# Patient Record
Sex: FEMALE | Race: WHITE | NOT HISPANIC OR LATINO | Employment: OTHER | URBAN - METROPOLITAN AREA
[De-identification: names, ages, dates, MRNs, and addresses within clinical notes are randomized per-mention and may not be internally consistent; named-entity substitution may affect disease eponyms.]

---

## 2017-09-27 ENCOUNTER — TRANSCRIBE ORDERS (OUTPATIENT)
Dept: ADMINISTRATIVE | Facility: HOSPITAL | Age: 62
End: 2017-09-27

## 2017-09-27 DIAGNOSIS — Z12.31 VISIT FOR SCREENING MAMMOGRAM: Primary | ICD-10-CM

## 2017-10-02 ENCOUNTER — HOSPITAL ENCOUNTER (OUTPATIENT)
Dept: MAMMOGRAPHY | Facility: CLINIC | Age: 62
Discharge: HOME | End: 2017-10-02
Payer: COMMERCIAL

## 2017-10-02 DIAGNOSIS — Z12.31 VISIT FOR SCREENING MAMMOGRAM: ICD-10-CM

## 2017-10-02 DIAGNOSIS — Z12.31 ENCOUNTER FOR SCREENING MAMMOGRAM FOR MALIGNANT NEOPLASM OF BREAST: ICD-10-CM

## 2017-10-02 PROCEDURE — G0202 SCR MAMMO BI INCL CAD: HCPCS

## 2018-09-26 ENCOUNTER — TRANSCRIBE ORDERS (OUTPATIENT)
Dept: ADMINISTRATIVE | Facility: HOSPITAL | Age: 63
End: 2018-09-26

## 2018-09-26 DIAGNOSIS — Z13.820 OSTEOPOROSIS SCREENING: Primary | ICD-10-CM

## 2018-09-26 DIAGNOSIS — Z12.39 SCREENING BREAST EXAMINATION: Primary | ICD-10-CM

## 2018-10-03 ENCOUNTER — HOSPITAL ENCOUNTER (OUTPATIENT)
Dept: MAMMOGRAPHY | Facility: CLINIC | Age: 63
Discharge: HOME | End: 2018-10-03
Payer: COMMERCIAL

## 2018-10-03 DIAGNOSIS — Z13.820 OSTEOPOROSIS SCREENING: ICD-10-CM

## 2018-10-03 DIAGNOSIS — Z12.39 SCREENING BREAST EXAMINATION: ICD-10-CM

## 2018-10-03 PROCEDURE — 77067 SCR MAMMO BI INCL CAD: CPT

## 2018-10-03 PROCEDURE — 77080 DXA BONE DENSITY AXIAL: CPT

## 2019-10-09 ENCOUNTER — TRANSCRIBE ORDERS (OUTPATIENT)
Dept: ADMINISTRATIVE | Facility: HOSPITAL | Age: 64
End: 2019-10-09

## 2019-10-09 DIAGNOSIS — Z13.6 ENCOUNTER FOR SCREENING FOR VASCULAR DISEASE: Primary | ICD-10-CM

## 2019-10-09 DIAGNOSIS — Z12.31 SCREENING MAMMOGRAM FOR HIGH-RISK PATIENT: Primary | ICD-10-CM

## 2019-10-17 ENCOUNTER — HOSPITAL ENCOUNTER (OUTPATIENT)
Dept: MAMMOGRAPHY | Facility: CLINIC | Age: 64
Discharge: HOME | End: 2019-10-17
Payer: COMMERCIAL

## 2019-10-17 VITALS — WEIGHT: 161 LBS | HEIGHT: 66 IN | BODY MASS INDEX: 25.88 KG/M2

## 2019-10-17 DIAGNOSIS — Z12.31 SCREENING MAMMOGRAM FOR HIGH-RISK PATIENT: ICD-10-CM

## 2019-10-17 PROCEDURE — 77067 SCR MAMMO BI INCL CAD: CPT

## 2019-10-18 ENCOUNTER — HOSPITAL ENCOUNTER (OUTPATIENT)
Dept: NON INVASIVE DIAGNOSTICS | Facility: HOSPITAL | Age: 64
Discharge: HOME | End: 2019-10-18
Payer: COMMERCIAL

## 2019-10-18 DIAGNOSIS — Z13.6 ENCOUNTER FOR SCREENING FOR VASCULAR DISEASE: ICD-10-CM

## 2019-10-18 PROCEDURE — 93922 UPR/L XTREMITY ART 2 LEVELS: CPT

## 2019-10-18 PROCEDURE — VASC: Performed by: SURGERY

## 2020-09-28 ENCOUNTER — TRANSCRIBE ORDERS (OUTPATIENT)
Dept: ADMINISTRATIVE | Facility: HOSPITAL | Age: 65
End: 2020-09-28

## 2020-09-28 DIAGNOSIS — Z12.31 ENCOUNTER FOR SCREENING MAMMOGRAM FOR MALIGNANT NEOPLASM OF BREAST: Primary | ICD-10-CM

## 2020-09-29 ENCOUNTER — TRANSCRIBE ORDERS (OUTPATIENT)
Dept: ADMINISTRATIVE | Facility: HOSPITAL | Age: 65
End: 2020-09-29

## 2020-09-29 DIAGNOSIS — Z13.6 ENCOUNTER FOR SCREENING FOR CARDIOVASCULAR DISORDERS: Primary | ICD-10-CM

## 2020-10-21 ENCOUNTER — HOSPITAL ENCOUNTER (OUTPATIENT)
Dept: MAMMOGRAPHY | Facility: CLINIC | Age: 65
Discharge: HOME | End: 2020-10-21
Payer: COMMERCIAL

## 2020-10-21 VITALS — WEIGHT: 161 LBS | HEIGHT: 66 IN | BODY MASS INDEX: 25.88 KG/M2

## 2020-10-21 DIAGNOSIS — Z12.31 ENCOUNTER FOR SCREENING MAMMOGRAM FOR MALIGNANT NEOPLASM OF BREAST: ICD-10-CM

## 2020-10-21 PROCEDURE — 77063 BREAST TOMOSYNTHESIS BI: CPT

## 2020-10-21 PROCEDURE — 77067 SCR MAMMO BI INCL CAD: CPT

## 2021-03-10 DIAGNOSIS — Z23 ENCOUNTER FOR IMMUNIZATION: ICD-10-CM

## 2021-08-30 ENCOUNTER — HOSPITAL ENCOUNTER (OUTPATIENT)
Dept: BONE DENSITY | Facility: CLINIC | Age: 66
Discharge: HOME | End: 2021-08-30
Payer: COMMERCIAL

## 2021-08-30 DIAGNOSIS — Z13.820 ENCOUNTER FOR SCREENING FOR OSTEOPOROSIS: ICD-10-CM

## 2021-08-30 PROCEDURE — 77080 DXA BONE DENSITY AXIAL: CPT

## 2021-10-22 ENCOUNTER — HOSPITAL ENCOUNTER (OUTPATIENT)
Dept: MAMMOGRAPHY | Facility: CLINIC | Age: 66
Discharge: HOME | End: 2021-10-22
Payer: COMMERCIAL

## 2021-10-22 VITALS — WEIGHT: 155 LBS | BODY MASS INDEX: 24.91 KG/M2 | HEIGHT: 66 IN

## 2021-10-22 DIAGNOSIS — Z12.31 ENCOUNTER FOR SCREENING MAMMOGRAM FOR MALIGNANT NEOPLASM OF BREAST: ICD-10-CM

## 2021-10-22 PROCEDURE — 77063 BREAST TOMOSYNTHESIS BI: CPT

## 2021-10-22 PROCEDURE — 77067 SCR MAMMO BI INCL CAD: CPT

## 2022-01-12 ENCOUNTER — CONSULT (OUTPATIENT)
Dept: ENDOCRINOLOGY | Facility: HOSPITAL | Age: 67
End: 2022-01-12
Payer: COMMERCIAL

## 2022-01-12 VITALS
HEIGHT: 66 IN | BODY MASS INDEX: 26.2 KG/M2 | SYSTOLIC BLOOD PRESSURE: 120 MMHG | HEART RATE: 92 BPM | WEIGHT: 163 LBS | DIASTOLIC BLOOD PRESSURE: 76 MMHG

## 2022-01-12 DIAGNOSIS — E11.42 DIABETIC POLYNEUROPATHY ASSOCIATED WITH TYPE 2 DIABETES MELLITUS (HCC): ICD-10-CM

## 2022-01-12 DIAGNOSIS — E11.65 TYPE 2 DIABETES MELLITUS WITH HYPERGLYCEMIA, WITHOUT LONG-TERM CURRENT USE OF INSULIN (HCC): Primary | ICD-10-CM

## 2022-01-12 DIAGNOSIS — I10 PRIMARY HYPERTENSION: ICD-10-CM

## 2022-01-12 DIAGNOSIS — E78.2 MIXED HYPERLIPIDEMIA: ICD-10-CM

## 2022-01-12 PROCEDURE — 99205 OFFICE O/P NEW HI 60 MIN: CPT | Performed by: INTERNAL MEDICINE

## 2022-01-12 RX ORDER — CALCIUM CITRATE/VITAMIN D3 200MG-6.25
1 TABLET ORAL
COMMUNITY

## 2022-01-12 RX ORDER — VALSARTAN AND HYDROCHLOROTHIAZIDE 320; 25 MG/1; MG/1
1 TABLET, FILM COATED ORAL DAILY
COMMUNITY

## 2022-01-12 RX ORDER — METFORMIN HYDROCHLORIDE 500 MG/1
1000 TABLET, EXTENDED RELEASE ORAL
COMMUNITY
End: 2022-01-12 | Stop reason: SDUPTHER

## 2022-01-12 RX ORDER — LANOLIN ALCOHOL/MO/W.PET/CERES
1000 CREAM (GRAM) TOPICAL DAILY
Start: 2022-01-12

## 2022-01-12 RX ORDER — ZINC GLUCONATE 50 MG
50 TABLET ORAL DAILY
COMMUNITY

## 2022-01-12 RX ORDER — PIOGLITAZONEHYDROCHLORIDE 45 MG/1
45 TABLET ORAL DAILY
COMMUNITY
Start: 2021-12-15

## 2022-01-12 RX ORDER — MULTIVIT-MIN/IRON/FOLIC ACID/K 18-600-40
CAPSULE ORAL DAILY
COMMUNITY

## 2022-01-12 RX ORDER — SITAGLIPTIN AND METFORMIN HYDROCHLORIDE 100; 1000 MG/1; MG/1
1 TABLET, FILM COATED, EXTENDED RELEASE ORAL DAILY
COMMUNITY
Start: 2022-01-01 | End: 2022-04-14 | Stop reason: SDUPTHER

## 2022-01-12 RX ORDER — OMEPRAZOLE 20 MG/1
20 CAPSULE, DELAYED RELEASE ORAL DAILY
COMMUNITY

## 2022-01-12 RX ORDER — ASPIRIN 81 MG/1
81 TABLET ORAL DAILY
COMMUNITY

## 2022-01-12 RX ORDER — METFORMIN HYDROCHLORIDE 500 MG/1
1000 TABLET, EXTENDED RELEASE ORAL
Start: 2022-01-12

## 2022-01-12 NOTE — PATIENT INSTRUCTIONS
hgba1c is 8 7%  this is too high  The pioglitazone may take a bit longer to see full effect  For now, continue the same dose of janumet, metformin, and pioglitazone  Let's move the metformin XR 1000 mg to the evening  Leave the janumet /1000 mg in am   Continue to take the pioglitazone in the am   Let's get you to diabetes education for nutrition  Continue to test blood sugars 2 times a day, try to test before and 2 hours after a meal, vary the meals  Take vitamin B 12 1000 mcg daily regularly  Follow up in 3 months  Blood work is due after feb 8, 2022

## 2022-01-12 NOTE — PROGRESS NOTES
1/12/2022    Assessment/Plan      Diagnoses and all orders for this visit:    Type 2 diabetes mellitus with hyperglycemia, without long-term current use of insulin ([facility]  -     Ambulatory referral to Diabetic Education; Future  -     vitamin B-12 (VITAMIN B-12) 1,000 mcg tablet; Take 1 tablet (1,000 mcg total) by mouth daily  -     metFORMIN (GLUCOPHAGE-XR) 500 mg 24 hr tablet; Take 2 tablets (1,000 mg total) by mouth daily with dinner  -     HEMOGLOBIN A1C W/ EAG ESTIMATION Lab Collect; Future  -     Comprehensive metabolic panel Lab Collect; Future  -     TSH, 3rd generation Lab Collect; Future  -     Microalbumin / creatinine urine ratio Lab Collect; Future    Primary hypertension  -     HEMOGLOBIN A1C W/ EAG ESTIMATION Lab Collect; Future  -     Comprehensive metabolic panel Lab Collect; Future  -     TSH, 3rd generation Lab Collect; Future  -     Microalbumin / creatinine urine ratio Lab Collect; Future    Mixed hyperlipidemia  -     HEMOGLOBIN A1C W/ EAG ESTIMATION Lab Collect; Future  -     Comprehensive metabolic panel Lab Collect; Future  -     TSH, 3rd generation Lab Collect; Future  -     Microalbumin / creatinine urine ratio Lab Collect; Future    Diabetic polyneuropathy associated with type 2 diabetes mellitus (HCC)  -     vitamin B-12 (VITAMIN B-12) 1,000 mcg tablet; Take 1 tablet (1,000 mcg total) by mouth daily  -     HEMOGLOBIN A1C W/ EAG ESTIMATION Lab Collect; Future  -     Comprehensive metabolic panel Lab Collect; Future  -     TSH, 3rd generation Lab Collect; Future  -     Microalbumin / creatinine urine ratio Lab Collect; Future    Other orders  -     Janumet -1000 MG TB24; Take 1 tablet by mouth in the morning    -     pioglitazone (ACTOS) 45 mg tablet; Take 45 mg by mouth daily    -     valsartan-hydrochlorothiazide (DIOVAN-HCT) 320-25 MG per tablet; Take 1 tablet by mouth daily  -     Discontinue: metFORMIN (GLUCOPHAGE-XR) 500 mg 24 hr tablet;  Take 1,000 mg by mouth daily with breakfast  -     aspirin (ECOTRIN LOW STRENGTH) 81 mg EC tablet; Take 81 mg by mouth daily  -     Multiple Vitamins-Minerals (Multivitamin Gummies Womens) CHEW; Chew 1 each daily  -     Ascorbic Acid (Vitamin C) 500 MG CAPS; Take by mouth in the morning  -     zinc gluconate 50 mg tablet; Take 50 mg by mouth daily  -     Nutritional Supplements (CANDIDA COMPLEX PO); Take by mouth Candida support supplement daily  -     omeprazole (PriLOSEC) 20 mg delayed release capsule; Take 20 mg by mouth daily    -     Magnesium Cl-Calcium Carbonate (SLOW-MAG PO); Take by mouth in the morning  -     MISC NATURAL PRODUCTS EX; Apply topically Magnesium cream to legs at night for leg cramps        Assessment/Plan:  1  Type 2 diabetes  Hemoglobin A1c was 8 7% when last evaluated  This is too high and demonstrates uncontrolled diabetes  She has started pioglitazone since that hemoglobin A1c and I reassured her that pioglitazone can take up to 3 months or more for full affect and has not even been 2 months  For now, she will continue the same pioglitazone 45 mg daily in the morning  I have asked her to move her metformin to metformin XR 1000 mg in the evening and continue Janumet /1000 mg in the morning  Perhaps by moving and splitting her metformin to twice a day this may help her blood sugars  I have asked her to continue to test her blood sugars twice a day before and 2 hours after a meal and vary the meals from day-to-day and keep a record  She will make an appointment with Diabetes Education for medical nutrition therapy as she has not been following her diet very well and needs location as she has never had Diabetes Education in the past     2  Diabetic neuropathy  Diabetic foot exam was performed in the office today  She has very minor decrease in vibration sensation with intact microfilament sensation so by definition does have neuropathy  I reassured her that is not a significant issue    I have recommended she start taking vitamin B12 1000 mcg daily on a regular basis as this may help with her neuropathic symptoms especially in light of her metformin usage as metformin can sometimes decrease absorption of vitamin B12     3  Hypertension  She is normotensive in the office on her current dose of valsartan/HCTZ  4  Hyperlipidemia  She will continue the same atorvastatin and fenofibrate  I have asked her to get a hemoglobin A1c, CMP, TSH, and urine microalbumin to creatinine ratio done next month when she is due for her next blood work  I have asked her to follow up in 3 months and blood work will be determined  CC: Diabetes Consult    History of Present Illness     HPI: Ashley Walsh is a 77y o  year old female with type 2 diabetes for about 18 years, hyperlipidemia, hypertension for evaluation/consult  She was diagnosed with gestational diabetes and pregnant with her children 30-40 years ago  She reports she originally started on metformin and was switched to Janumet about 8-10 years ago  She reports she did try Jardiance but developed yeast infections and was too costly and stopped it within the last month or so  She had tried Invokana in the past but this did not change her blood sugars  She had tried Trulicity in the past but this was too costly and her blood sugars did improve and was discontinued around March 2021  Pioglitazone was added in November 2021 after her last blood work  She has limitations on the types of medications she can use due to being on Medicare and the cost of medicines when she is in the donut hole  She has never been on insulin or so funny urea in the past   She is on oral agents at home and takes metformin  mg 2 tablets in the morning and Janumet /1000 mg in the morning and pioglitazone 45 mg daily  She denies any polyphagia, polydipsia, and blurry vision  She has polyuria and once or twice a night nocturia    She has rare numbness and tingling of the feet  She denies chest pain or shortness of breath  She denies nephropathy, retinopathy, heart attack, stroke and claudication but does admit to neuropathy  She has never been to Diabetes Education and admits that carbohydrates are the problem and she tries to eat better snacks but is not always consistent with this  Hypoglycemic episodes: No never  H/o of hypoglycemia causing hospitalization or intervention such as glucagon injection  or ambulance call  No   Hypoglycemia symptoms: dizzines and faint  Treatment of hypoglycemia: eat food  Glucagon:No   Medic alert tag: recommended,Yes  The patient's last eye exam was in summer 2021 with no retinopathy but a slight cataract starting  The patient's last foot exam was not recently  She does not see Podiatry  Last A1C was 8 7% on 11/8/2021  Blood Sugar/Glucometer/Pump/CGM review:  Blood sugars are tested twice a day  Blood sugars are always in the upper 100s in the morning  She can of blood sugars that go down to the 80s at about 2 hours after meals and often about 150s before meals later in the day  She denies hypoglycemia  She has hyperlipidemia and takes fenofibrate 54 mg daily and atorvastatin 40 mg daily  She denies chest pain or shortness of breath  She has hypertension and takes valsartan/HCTZ 320/25 mg daily  She denies headache or stroke-like symptoms  Review of Systems   Constitutional: Positive for fatigue  Negative for unexpected weight change  Admits diet not the best  Trying to pick better snacks  HENT: Negative for hearing loss, tinnitus and trouble swallowing  Eyes: Negative for visual disturbance  Respiratory: Negative for chest tightness and shortness of breath  Cardiovascular: Negative for chest pain, palpitations and leg swelling  Gastrointestinal: Negative for abdominal pain, constipation, diarrhea and nausea  Endocrine: Positive for polyuria  Negative for polydipsia and polyphagia  Nocturia 1-2 times a night  Musculoskeletal: Negative for arthralgias and back pain  Skin: Negative for rash and wound  Some finger cracking and dry skin  No brittle nails  No hair loss  Neurological: Positive for numbness  Negative for dizziness, tremors, weakness, light-headedness and headaches  Rare numbness and tingling of the feet, will use magnesium cream and helps  In the cold, fingers and toes get numb easily  Psychiatric/Behavioral: Positive for sleep disturbance  Negative for dysphoric mood  The patient is not nervous/anxious  Stays up too late, then wakes to urinate          Historical Information   Past Medical History:   Diagnosis Date    Bicornate uterus     GERD (gastroesophageal reflux disease)     Hyperlipidemia     Hypertension      Past Surgical History:   Procedure Laterality Date     SECTION      X 2    CHOLECYSTECTOMY      lap    TUBAL LIGATION Bilateral      Social History   Social History     Substance and Sexual Activity   Alcohol Use Yes    Alcohol/week: 4 0 standard drinks    Types: 2 Glasses of wine, 2 Cans of beer per week     Social History     Substance and Sexual Activity   Drug Use No     Social History     Tobacco Use   Smoking Status Former Smoker    Packs/day: 0 25    Years: 10 00    Pack years: 2 50    Types: Cigarettes    Start date: 1975   Nikita Abt Quit date: 1985    Years since quittin 0   Smokeless Tobacco Never Used     Family History:   Family History   Problem Relation Age of Onset    Myocarditis Mother     Melanoma Father     Diabetes type II Father     Stroke Father     Diabetes type II Sister     Hypertension Sister     No Known Problems Daughter     Breast cancer Maternal Grandmother          at an early age   Nikita Abt No Known Problems Maternal Grandfather     No Known Problems Paternal Grandmother     No Known Problems Paternal Grandfather     No Known Problems Daughter     Breast cancer Maternal Aunt         unknown age of onset   David Oleary No Known Problems Maternal Aunt     No Known Problems Paternal Aunt     Hypertension Brother     Diabetes type II Paternal Uncle        Meds/Allergies   Current Outpatient Medications   Medication Sig Dispense Refill    Ascorbic Acid (Vitamin C) 500 MG CAPS Take by mouth in the morning      aspirin (ECOTRIN LOW STRENGTH) 81 mg EC tablet Take 81 mg by mouth daily      atorvastatin (LIPITOR) 40 mg tablet Take 40 mg by mouth in the morning        FENOFIBRATE MICRONIZED PO Take 54 mg by mouth in the morning        Janumet -1000 MG TB24 Take 1 tablet by mouth in the morning        Magnesium Cl-Calcium Carbonate (SLOW-MAG PO) Take by mouth in the morning      metFORMIN (GLUCOPHAGE-XR) 500 mg 24 hr tablet Take 2 tablets (1,000 mg total) by mouth daily with dinner      MISC NATURAL PRODUCTS EX Apply topically Magnesium cream to legs at night for leg cramps      Multiple Vitamins-Minerals (Multivitamin Gummies Womens) CHEW Chew 1 each daily      Nutritional Supplements (CANDIDA COMPLEX PO) Take by mouth Candida support supplement daily      omeprazole (PriLOSEC) 20 mg delayed release capsule Take 20 mg by mouth daily        pioglitazone (ACTOS) 45 mg tablet Take 45 mg by mouth daily        valsartan-hydrochlorothiazide (DIOVAN-HCT) 320-25 MG per tablet Take 1 tablet by mouth daily      zinc gluconate 50 mg tablet Take 50 mg by mouth daily      vitamin B-12 (VITAMIN B-12) 1,000 mcg tablet Take 1 tablet (1,000 mcg total) by mouth daily       No current facility-administered medications for this visit  Allergies   Allergen Reactions    Jardiance [Empagliflozin] Other (See Comments)     Frequent yeast infections    Iodinated Diagnostic Agents Rash       Objective   Vitals: Blood pressure 120/76, pulse 92, height 5' 6" (1 676 m), weight 73 9 kg (163 lb)  Invasive Devices  Report    None                 Physical Exam  Vitals reviewed     Constitutional: Appearance: Normal appearance  She is well-developed  HENT:      Head: Normocephalic and atraumatic  Eyes:      Extraocular Movements: Extraocular movements intact  Conjunctiva/sclera: Conjunctivae normal       Comments: No lid lag, stare, proptosis, or periorbital edema  Neck:      Thyroid: No thyromegaly  Vascular: No carotid bruit  Comments: Thyroid normal in size  No palpable thyroid nodules  No bruits over the thyroid gland  Cardiovascular:      Rate and Rhythm: Normal rate and regular rhythm  Pulses: Normal pulses  no weak pulses          Dorsalis pedis pulses are 2+ on the right side and 2+ on the left side  Posterior tibial pulses are 2+ on the right side and 2+ on the left side  Heart sounds: Normal heart sounds  No murmur heard  Pulmonary:      Effort: Pulmonary effort is normal       Breath sounds: Normal breath sounds  No wheezing  Abdominal:      General: Bowel sounds are normal       Palpations: Abdomen is soft  Tenderness: There is no abdominal tenderness  Musculoskeletal:         General: No deformity  Normal range of motion  Cervical back: Normal range of motion and neck supple  Right lower leg: No edema  Left lower leg: No edema  Comments: No ulcerations of the feet  No tremor of the outstretched hands  No spinous process tenderness  No CVA tenderness  Feet:      Right foot:      Skin integrity: No ulcer, skin breakdown, erythema, warmth, callus or dry skin  Left foot:      Skin integrity: No ulcer, skin breakdown, erythema, warmth, callus or dry skin  Lymphadenopathy:      Cervical: No cervical adenopathy  Skin:     General: Skin is warm and dry  Findings: No rash  Neurological:      Mental Status: She is alert and oriented to person, place, and time  Deep Tendon Reflexes: Reflexes are normal and symmetric  Comments: Vibration sensation diminished to the 1st toe DIP joint bilaterally  Microfilament sensation intact bilaterally  Deep tendon reflexes normal      Patient's shoes and socks removed  Right Foot/Ankle   Right Foot Inspection  Skin Exam: skin normal and skin intact  No dry skin, no warmth, no callus, no erythema, no maceration, no abnormal color, no pre-ulcer, no ulcer and no callus  Toe Exam: ROM and strength within normal limits  No swelling and  no right toe deformity    Sensory   Vibration: diminished  Monofilament testing: intact    Vascular  Capillary refills: < 3 seconds  The right DP pulse is 2+  The right PT pulse is 2+  Left Foot/Ankle  Left Foot Inspection  Skin Exam: skin normal and skin intact  No dry skin, no warmth, no erythema, no maceration, normal color, no pre-ulcer, no ulcer and no callus  Toe Exam: ROM and strength within normal limits  No swelling and no left toe deformity  Sensory   Vibration: diminished  Monofilament testing: intact    Vascular  Capillary refills: < 3 seconds  The left DP pulse is 2+  The left PT pulse is 2+  Assign Risk Category  No deformity present  Loss of protective sensation  No weak pulses  Risk: 1        The history was obtained from the review of the chart and from the patient  Lab Results:    Blood work done on 11/08/2021 demonstrates a hemoglobin A1c of 8 7%  Please BS hemoglobin A1c on 03/12/2021 was 7 4% and on 04/01/2020 was 7 3%        Future Appointments   Date Time Provider Yamila Coats   1/20/2022  2:00 PM Hank Miguel DIAB ED [facility]   4/13/2022 11:40 AM Dorys Finch MD ENDO [facility]

## 2022-01-20 ENCOUNTER — OFFICE VISIT (OUTPATIENT)
Dept: DIABETES SERVICES | Facility: HOSPITAL | Age: 67
End: 2022-01-20
Payer: COMMERCIAL

## 2022-01-20 VITALS — BODY MASS INDEX: 26.31 KG/M2 | WEIGHT: 163 LBS

## 2022-01-20 DIAGNOSIS — E11.65 TYPE 2 DIABETES MELLITUS WITH HYPERGLYCEMIA, WITHOUT LONG-TERM CURRENT USE OF INSULIN (HCC): Primary | ICD-10-CM

## 2022-01-20 PROCEDURE — 97802 MEDICAL NUTRITION INDIV IN: CPT | Performed by: DIETITIAN, REGISTERED

## 2022-01-20 NOTE — PROGRESS NOTES
Medical Nutrition Therapy     Assessment    Visit Type: Initial visit  Chief complaint:  Type 2 Diabetes     HPI:   Met with Mitzi Troy for MNT initial visit  States she has started testing her blood sugars more since her last high A1C  Twice a day currently, testing midmorning not before breakfast, and at night  I asked her to start pair testing, before a meal and 2hr after a meal, rotating between meals  This is the best way to learn how food and meals affect her personally  Food recall shows primary issues: Carbs vary throughout the day, discussed the benefit of consistent carb intake throughout the day  Together we discussed what foods contain CHO, reading a food label, serving sizes, and set a carb goal of 30-45g CHO/meal to promote weight loss with 15g snacks  Put together sample meals for Sarahi's reference and evaluated Sarahi's current eating plan  Good understanding, Jenise Obrien will call with questions or for more education  Follow up as needed if not improving  Ht Readings from Last 1 Encounters:   01/12/22 5' 6" (1 676 m)     Wt Readings from Last 3 Encounters:   01/20/22 73 9 kg (163 lb)   01/12/22 73 9 kg (163 lb)   10/22/21 70 3 kg (155 lb)        Body mass index is 26 31 kg/m²       No results found for: HGBA1C    No results found for: CHOL  No results found for: HDL  No results found for: LDLCALC  No results found for: TRIG  No results found for: CHOLHDL    Weight Change: No    Medical Diagnosis/ICD 10 Code:  E11 65    Barriers to Learning: no barriers    Do you follow any special diet presently?: No  Who shops: patient and spouse  Who cooks: patient    Food Log: Completed via the method of food recall- lives with spouse     Breakfast: up around 7-8am  Bowl of cereal and fruit; 1 bread and homemade jam; eggs sometimes half a bagel  Morning Snack:no much, tea  Lunch: by 1pm: triple zero yogurt with fruit added and crackers and cheese; 1 bread with turkey and cheese, veggies, pickle; soups homemade bowl and crackers and cheese; no leftovers  Afternoon Snack:  Fiber brownie bar, small banana or half or other fruit, a few pretzels a little  Dinner: 6pm, but sometimes later  Meat starch veggie hot meal, goes their own potatoes, will have half a baked potatoes hot meal  Salads with dinner  Evening Snack: trouble spot, pretzel mily, sugar free cookies, tea, applesauce, yogurt, bed: 11:30pm  Beverages: water, diet iced tea, diet soda, hot tea in the winter with a little milk, no milk, no juice, cranberry with bladder issues, alcohol sometimes cirilo ultra 1   Eating out/Take out:  Exercise ADL, nothing structured, working on the farm active  Nutrition Diagnosis:  Food and nutrition related knowledge deficit  related to Lack of prior exposure to accurate nutrition related information as evidenced by Verbalizes inaccurate or incomplete information    Intervention: plate method, label reading, carbohydrate counting, meal planning, individualized meal plan and monitoring portion control     Treatment Goals: Patient understands education and recommendations    Education Material Given  Natty Chaudhari was provided the Portion Booklet and Planning Healthy Meals     Monitoring and evaluation:    Term code indicator   1 6 3 Carbohydrate Intake Criteria: 30-45g CHO per meal, 15g CHO snacks    Patients Response to Instruction:  Marc Deluna  Expected Compliancegood    Thank you for coming to the [facility] for education today  Please feel free to call with any questions or concerns      Jordon Marmolejo, [address]  [address] [facility] [address]  [address] [facility] [id number]

## 2022-03-16 LAB
ALBUMIN SERPL-MCNC: 4.4 G/DL (ref 3.6–5.1)
ALBUMIN/CREAT UR: 6 MCG/MG CREAT
ALBUMIN/GLOB SERPL: 1.8 (CALC) (ref 1–2.5)
ALP SERPL-CCNC: 28 U/L (ref 37–153)
ALT SERPL-CCNC: 13 U/L (ref 6–29)
AST SERPL-CCNC: 13 U/L (ref 10–35)
BILIRUB SERPL-MCNC: 0.5 MG/DL (ref 0.2–1.2)
BUN SERPL-MCNC: 27 MG/DL (ref 7–25)
BUN/CREAT SERPL: 43 (CALC) (ref 6–22)
CALCIUM SERPL-MCNC: 9.7 MG/DL (ref 8.6–10.4)
CHLORIDE SERPL-SCNC: 100 MMOL/L (ref 98–110)
CO2 SERPL-SCNC: 29 MMOL/L (ref 20–32)
CREAT SERPL-MCNC: 0.63 MG/DL (ref 0.5–0.99)
CREAT UR-MCNC: 50 MG/DL (ref 20–275)
EST. AVERAGE GLUCOSE BLD GHB EST-MCNC: 160 MG/DL
EST. AVERAGE GLUCOSE BLD GHB EST-SCNC: 8.9 MMOL/L
GLOBULIN SER CALC-MCNC: 2.5 G/DL (CALC) (ref 1.9–3.7)
GLUCOSE SERPL-MCNC: 111 MG/DL (ref 65–99)
HBA1C MFR BLD: 7.2 % OF TOTAL HGB
MICROALBUMIN UR-MCNC: 0.3 MG/DL
POTASSIUM SERPL-SCNC: 4 MMOL/L (ref 3.5–5.3)
PROT SERPL-MCNC: 6.9 G/DL (ref 6.1–8.1)
SL AMB EGFR AFRICAN AMERICAN: 108 ML/MIN/1.73M2
SL AMB EGFR NON AFRICAN AMERICAN: 93 ML/MIN/1.73M2
SODIUM SERPL-SCNC: 138 MMOL/L (ref 135–146)
TSH SERPL-ACNC: 3.35 MIU/L (ref 0.4–4.5)

## 2022-03-18 ENCOUNTER — TELEPHONE (OUTPATIENT)
Dept: ENDOCRINOLOGY | Facility: HOSPITAL | Age: 67
End: 2022-03-18

## 2022-03-21 NOTE — TELEPHONE ENCOUNTER
I responded to her via the patient portal  Can we get her freestyle [place] as she says we are now linked  I will order blood work for before her next appointment

## 2022-03-22 NOTE — TELEPHONE ENCOUNTER
Freestyle [place] download from 03/09/2022 through 03/22/2022 was reviewed  Overall, her blood sugars look quite good with 98% of blood sugars in target range  There are few higher blood sugars with 1 day that had a spike in the evening but for the most part, blood sugars look quite good  For now, continue the same metformin, Janumet, and pioglitazone

## 2022-04-14 ENCOUNTER — OFFICE VISIT (OUTPATIENT)
Dept: ENDOCRINOLOGY | Facility: HOSPITAL | Age: 67
End: 2022-04-14
Payer: COMMERCIAL

## 2022-04-14 VITALS
HEIGHT: 66 IN | SYSTOLIC BLOOD PRESSURE: 120 MMHG | DIASTOLIC BLOOD PRESSURE: 68 MMHG | BODY MASS INDEX: 25.78 KG/M2 | HEART RATE: 94 BPM | WEIGHT: 160.4 LBS | OXYGEN SATURATION: 98 %

## 2022-04-14 DIAGNOSIS — E11.65 TYPE 2 DIABETES MELLITUS WITH HYPERGLYCEMIA, WITHOUT LONG-TERM CURRENT USE OF INSULIN (HCC): Primary | ICD-10-CM

## 2022-04-14 DIAGNOSIS — E11.42 DIABETIC POLYNEUROPATHY ASSOCIATED WITH TYPE 2 DIABETES MELLITUS (HCC): ICD-10-CM

## 2022-04-14 DIAGNOSIS — E78.2 MIXED HYPERLIPIDEMIA: ICD-10-CM

## 2022-04-14 DIAGNOSIS — I10 PRIMARY HYPERTENSION: ICD-10-CM

## 2022-04-14 PROCEDURE — 99214 OFFICE O/P EST MOD 30 MIN: CPT | Performed by: INTERNAL MEDICINE

## 2022-04-14 PROCEDURE — 95251 CONT GLUC MNTR ANALYSIS I&R: CPT | Performed by: INTERNAL MEDICINE

## 2022-04-14 RX ORDER — SITAGLIPTIN AND METFORMIN HYDROCHLORIDE 100; 1000 MG/1; MG/1
1 TABLET, FILM COATED, EXTENDED RELEASE ORAL DAILY
Qty: 30 TABLET | Refills: 5 | Status: SHIPPED | OUTPATIENT
Start: 2022-04-14

## 2022-04-14 NOTE — PATIENT INSTRUCTIONS
hgba1c is 7 2%  this is much better  No cahnge in medications for now  Continue to work on diet  Continue to use the freestyle [place]  Follow up in 3 months with blood work

## 2022-05-09 LAB
LEFT EYE DIABETIC RETINOPATHY: NORMAL
RIGHT EYE DIABETIC RETINOPATHY: NORMAL

## 2022-07-12 LAB
ALBUMIN SERPL-MCNC: 4.5 G/DL (ref 3.6–5.1)
ALBUMIN/GLOB SERPL: 1.9 (CALC) (ref 1–2.5)
ALP SERPL-CCNC: 26 U/L (ref 37–153)
ALT SERPL-CCNC: 14 U/L (ref 6–29)
AST SERPL-CCNC: 11 U/L (ref 10–35)
BILIRUB SERPL-MCNC: 0.3 MG/DL (ref 0.2–1.2)
BUN SERPL-MCNC: 21 MG/DL (ref 7–25)
BUN/CREAT SERPL: ABNORMAL (CALC) (ref 6–22)
CALCIUM SERPL-MCNC: 9.6 MG/DL (ref 8.6–10.4)
CHLORIDE SERPL-SCNC: 105 MMOL/L (ref 98–110)
CHOLEST SERPL-MCNC: 141 MG/DL
CHOLEST/HDLC SERPL: 2 (CALC)
CO2 SERPL-SCNC: 29 MMOL/L (ref 20–32)
CREAT SERPL-MCNC: 0.6 MG/DL (ref 0.5–1.05)
EST. AVERAGE GLUCOSE BLD GHB EST-MCNC: 137 MG/DL
EST. AVERAGE GLUCOSE BLD GHB EST-SCNC: 7.6 MMOL/L
GFR/BSA.PRED SERPLBLD CYS-BASED-ARV: 99 ML/MIN/1.73M2
GLOBULIN SER CALC-MCNC: 2.4 G/DL (CALC) (ref 1.9–3.7)
GLUCOSE SERPL-MCNC: 123 MG/DL (ref 65–99)
HBA1C MFR BLD: 6.4 % OF TOTAL HGB
HDLC SERPL-MCNC: 69 MG/DL
LDLC SERPL CALC-MCNC: 58 MG/DL (CALC)
NONHDLC SERPL-MCNC: 72 MG/DL (CALC)
POTASSIUM SERPL-SCNC: 4.3 MMOL/L (ref 3.5–5.3)
PROT SERPL-MCNC: 6.9 G/DL (ref 6.1–8.1)
SODIUM SERPL-SCNC: 140 MMOL/L (ref 135–146)
TRIGL SERPL-MCNC: 59 MG/DL

## 2022-07-19 ENCOUNTER — OFFICE VISIT (OUTPATIENT)
Dept: ENDOCRINOLOGY | Facility: HOSPITAL | Age: 67
End: 2022-07-19
Payer: COMMERCIAL

## 2022-07-19 VITALS
BODY MASS INDEX: 25.65 KG/M2 | SYSTOLIC BLOOD PRESSURE: 128 MMHG | HEIGHT: 66 IN | HEART RATE: 78 BPM | WEIGHT: 159.6 LBS | DIASTOLIC BLOOD PRESSURE: 82 MMHG

## 2022-07-19 DIAGNOSIS — E11.42 DIABETIC POLYNEUROPATHY ASSOCIATED WITH TYPE 2 DIABETES MELLITUS (HCC): ICD-10-CM

## 2022-07-19 DIAGNOSIS — I10 PRIMARY HYPERTENSION: ICD-10-CM

## 2022-07-19 DIAGNOSIS — E11.65 TYPE 2 DIABETES MELLITUS WITH HYPERGLYCEMIA, WITHOUT LONG-TERM CURRENT USE OF INSULIN (HCC): Primary | ICD-10-CM

## 2022-07-19 DIAGNOSIS — E78.2 MIXED HYPERLIPIDEMIA: ICD-10-CM

## 2022-07-19 PROCEDURE — 99214 OFFICE O/P EST MOD 30 MIN: CPT | Performed by: INTERNAL MEDICINE

## 2022-07-19 PROCEDURE — 95251 CONT GLUC MNTR ANALYSIS I&R: CPT | Performed by: INTERNAL MEDICINE

## 2022-07-19 NOTE — PROGRESS NOTES
7/19/2022    Assessment/Plan      Diagnoses and all orders for this visit:    Type 2 diabetes mellitus with hyperglycemia, without long-term current use of insulin ([facility] [address] )  -     HEMOGLOBIN A1C W/ EAG ESTIMATION Lab Collect; Future  -     Comprehensive metabolic panel Lab Collect; Future  -     Magnesium Lab Collect; Future  -     Phosphorus Lab Collect; Future    Diabetic polyneuropathy associated with type 2 diabetes mellitus (HCC)  -     HEMOGLOBIN A1C W/ EAG ESTIMATION Lab Collect; Future  -     Comprehensive metabolic panel Lab Collect; Future  -     Magnesium Lab Collect; Future  -     Phosphorus Lab Collect; Future    Primary hypertension  -     HEMOGLOBIN A1C W/ EAG ESTIMATION Lab Collect; Future  -     Comprehensive metabolic panel Lab Collect; Future  -     Magnesium Lab Collect; Future  -     Phosphorus Lab Collect; Future    Mixed hyperlipidemia  -     HEMOGLOBIN A1C W/ EAG ESTIMATION Lab Collect; Future  -     Comprehensive metabolic panel Lab Collect; Future  -     Magnesium Lab Collect; Future  -     Phosphorus Lab Collect; Future        Assessment/Plan:  1  Type 2 diabetes  Hemoglobin A1c is 6 4%  This does demonstrate excellent control of her diabetes and for now she will continue the same Janumet, metformin, and pioglitazone  She will continue to work on dietary changes and exercise  She will continue to utilize the [employer] [address] continuous glucose monitoring system  2  Diabetic neuropathy  She has stable neuropathic symptoms  Diabetic foot exams are up-to-date  3  Hypertension  She is normotensive in the office on her current dose of valsartan/HCTZ  4  Hyperlipidemia  Lipid profile is excellent  She will continue the same atorvastatin and fenofibrate  I have asked her to follow up in 3 months with preceding hemoglobin A1c, CMP, phosphorus, and magnesium        CC: Diabetes type 2, blood pressure, lipid follow-up    History of Present Illness     HPI: eNvaeh Poe is a 77y o  year old female with type 2 diabetes with neuropathy for 18 years, hypertension, hyperlipidemia for follow-up  She is on oral agents at home and takes Janumet /1000 mg daily, metformin  mg 2 at supper, and pioglitazone 45 mg daily  She denies any polyuria, polydipsia, and blurry vision  She has polyphagia and once a night nocturia  She has unchanged numbness and tingling of the feet  She denies chest pain or shortness of breath  She denies nephropathy, retinopathy, heart attack, stroke and claudication but does admit to neuropathy  Hypoglycemic episodes: Yes rare  The patient's last eye exam was in may 2022 with no retinopathy  Some abnormality not related to diabetes being followed every 3 months  The patient's last foot exam was in January 2022 at Endocrine office visit  She does not see Podiatry  Last A1C was   Lab Results   Component Value Date    HGBA1C 6 4 (H) 07/12/2022     Blood Sugar/Glucometer/Pump/CGM review:  Utilizes a freestyle Sweta 2 continuous glucose monitoring system to test her blood sugars at least 6 times a day  Freestyle Sweta download from 07/06/2022 through 07/19/2022 was reviewed in the office today  Average glucose is 122 mg/dL with a glucose variability of 22 6%  95% blood sugars are in target range, 4% high, 0% very high, 1% low, and 0% very low  Overall, her freestyle [employer] demonstrates relatively stable blood sugars which are minor spike in the mid afternoon  She has hyperlipidemia and takes atorvastatin 40 mg daily and fenofibrate 54 mg daily  She denies chest pain or shortness of breath  She has hypertension and takes valsartan/HCTZ 320/25 mg daily  She denies headache or stroke-like symptoms  Review of Systems   Constitutional: Negative for fatigue and unexpected weight change  Eyes: Negative for visual disturbance  Wears glasses  Respiratory: Negative for chest tightness and shortness of breath  Cardiovascular: Negative for chest pain  Gastrointestinal: Negative for abdominal pain, constipation, diarrhea and nausea  Endocrine: Positive for polyphagia  Negative for polydipsia and polyuria  Nocturia once a night   Working on drinking a lot more water  Some hunger at times mid day  Musculoskeletal: Positive for arthralgias and neck pain  Negative for myalgias  Getting some joint aches  Some neck pain at times  Skin: Negative for wound  Neurological: Positive for numbness  Negative for dizziness, weakness, light-headedness and headaches  No change in numbness and tingling of the feet  Psychiatric/Behavioral: Negative for sleep disturbance         Historical Information   Past Medical History:   Diagnosis Date    Bicornate uterus     GERD (gastroesophageal reflux disease)     Hyperlipidemia     Hypertension      Past Surgical History:   Procedure Laterality Date     SECTION      X 2    CHOLECYSTECTOMY      lap    TUBAL LIGATION Bilateral      Social History   Social History     Substance and Sexual Activity   Alcohol Use Yes    Alcohol/week: 4 0 standard drinks    Types: 2 Glasses of wine, 2 Cans of beer per week     Social History     Substance and Sexual Activity   Drug Use No     Social History     Tobacco Use   Smoking Status Former Smoker    Packs/day: 0 25    Years: 10 00    Pack years: 2 50    Types: Cigarettes    Start date: 1975   Eyal Diamond Quit date: 1985    Years since quittin 5   Smokeless Tobacco Never Used     Family History:   Family History   Problem Relation Age of Onset    Myocarditis Mother     Melanoma Father     Diabetes type II Father     Stroke Father     Diabetes type II Sister     Hypertension Sister     No Known Problems Daughter     Breast cancer Maternal Grandmother          at an early age   Eyal Diamond No Known Problems Maternal Grandfather     No Known Problems Paternal Grandmother     No Known Problems Paternal Grandfather     No Known Problems Daughter     Breast cancer Maternal Aunt         unknown age of onset   Reggie Pert No Known Problems Maternal Aunt     No Known Problems Paternal Aunt     Hypertension Brother     Diabetes type II Brother     Diabetes type II Paternal Uncle        Meds/Allergies   Current Outpatient Medications   Medication Sig Dispense Refill    Ascorbic Acid (Vitamin C) 500 MG CAPS Take by mouth in the morning      aspirin (ECOTRIN LOW STRENGTH) 81 mg EC tablet Take 81 mg by mouth daily      atorvastatin (LIPITOR) 40 mg tablet Take 40 mg by mouth in the morning        FENOFIBRATE MICRONIZED PO Take 54 mg by mouth in the morning        Janumet -1000 MG TB24 Take 1 tablet by mouth in the morning 30 tablet 5    Magnesium Cl-Calcium Carbonate (SLOW-MAG PO) Take by mouth in the morning      metFORMIN (GLUCOPHAGE-XR) 500 mg 24 hr tablet Take 2 tablets (1,000 mg total) by mouth daily with dinner      MISC NATURAL PRODUCTS EX Apply topically Magnesium cream to legs at night for leg cramps      Multiple Vitamins-Minerals (Multivitamin Gummies Womens) CHEW Chew 1 each daily      Nutritional Supplements (CANDIDA COMPLEX PO) Take by mouth Candida support supplement daily      omeprazole (PriLOSEC) 20 mg delayed release capsule Take 20 mg by mouth daily        pioglitazone (ACTOS) 45 mg tablet Take 45 mg by mouth daily        valsartan-hydrochlorothiazide (DIOVAN-HCT) 320-25 MG per tablet Take 1 tablet by mouth daily      vitamin B-12 (VITAMIN B-12) 1,000 mcg tablet Take 1 tablet (1,000 mcg total) by mouth daily      zinc gluconate 50 mg tablet Take 50 mg by mouth daily       No current facility-administered medications for this visit       Allergies   Allergen Reactions    Jardiance [Empagliflozin] Other (See Comments)     Frequent yeast infections    Iodinated Diagnostic Agents Rash       Objective   Vitals: Blood pressure 128/82, pulse 78, height 5' 6" (1 676 m), weight 72 4 kg (159 lb 9 6 oz)  Invasive Devices  Report    None                 Physical Exam  Vitals reviewed  Constitutional:       Appearance: Normal appearance  She is well-developed  HENT:      Head: Normocephalic and atraumatic  Eyes:      Conjunctiva/sclera: Conjunctivae normal    Neck:      Thyroid: No thyromegaly  Vascular: No carotid bruit  Comments: Thyroid normal in size  Cardiovascular:      Rate and Rhythm: Normal rate and regular rhythm  Heart sounds: Normal heart sounds  No murmur heard  Pulmonary:      Effort: Pulmonary effort is normal       Breath sounds: Normal breath sounds  No wheezing  Abdominal:      Palpations: Abdomen is soft  Musculoskeletal:         General: No deformity  Normal range of motion  Cervical back: Normal range of motion and neck supple  Right lower leg: No edema  Left lower leg: No edema  Lymphadenopathy:      Cervical: No cervical adenopathy  Skin:     General: Skin is warm and dry  Findings: No rash  Neurological:      Mental Status: She is alert and oriented to person, place, and time  Deep Tendon Reflexes: Reflexes are normal and symmetric  The history was obtained from the review of the chart and from the patient  Lab Results:    Most recent Alc is  Lab Results   Component Value Date    HGBA1C 6 4 (H) 07/12/2022           Blood work done on 07/12/2022 showed a CMP with a glucose of 123 fasting but was otherwise normal   Lab Results   Component Value Date    CREATININE 0 60 07/12/2022    CREATININE 0 63 03/15/2022    BUN 21 07/12/2022    K 4 3 07/12/2022     07/12/2022    CO2 29 07/12/2022     eGFR   Date Value Ref Range Status   07/12/2022 99 > OR = 60 mL/min/1 73m2 Final     Comment:     The eGFR is based on the CKD-EPI 2021 equation  To calculate   the new eGFR from a previous Creatinine or Cystatin C  result, go to Jonna at  [email or web address]  [email or web address]         Total cholesterol 141, LDL cholesterol 58    Lab Results   Component Value Date    HDL 69 07/12/2022    TRIG 59 07/12/2022       Lab Results   Component Value Date    ALT 14 07/12/2022    AST 11 07/12/2022    ALKPHOS 26 (L) 07/12/2022       Lab Results   Component Value Date    TSH 3 35 03/15/2022       Future Appointments   Date Time Provider Yamila Coats   11/15/2022 11:40 AM Val Rider MD [facility]

## 2022-07-19 NOTE — PATIENT INSTRUCTIONS
Hgba1c is 6 4%  this is excellent  Continue the same jaunmet, metformin, and pioglitazone  Continue to use the freestyle osman  The cholesterol is good  Follow up in 3 months with blood work

## 2022-10-29 LAB
ALBUMIN SERPL-MCNC: 4.8 G/DL (ref 3.6–5.1)
ALBUMIN/GLOB SERPL: 1.8 (CALC) (ref 1–2.5)
ALP SERPL-CCNC: 30 U/L (ref 37–153)
ALT SERPL-CCNC: 12 U/L (ref 6–29)
AST SERPL-CCNC: 10 U/L (ref 10–35)
BILIRUB SERPL-MCNC: 0.4 MG/DL (ref 0.2–1.2)
BUN SERPL-MCNC: 29 MG/DL (ref 7–25)
BUN/CREAT SERPL: 42 (CALC) (ref 6–22)
CALCIUM SERPL-MCNC: 9.9 MG/DL (ref 8.6–10.4)
CHLORIDE SERPL-SCNC: 100 MMOL/L (ref 98–110)
CO2 SERPL-SCNC: 27 MMOL/L (ref 20–32)
CREAT SERPL-MCNC: 0.69 MG/DL (ref 0.5–1.05)
EST. AVERAGE GLUCOSE BLD GHB EST-MCNC: 137 MG/DL
EST. AVERAGE GLUCOSE BLD GHB EST-SCNC: 7.6 MMOL/L
GFR/BSA.PRED SERPLBLD CYS-BASED-ARV: 96 ML/MIN/1.73M2
GLOBULIN SER CALC-MCNC: 2.7 G/DL (CALC) (ref 1.9–3.7)
GLUCOSE SERPL-MCNC: 133 MG/DL (ref 65–99)
HBA1C MFR BLD: 6.4 % OF TOTAL HGB
MAGNESIUM SERPL-MCNC: 2 MG/DL (ref 1.5–2.5)
PHOSPHATE SERPL-MCNC: 4.3 MG/DL (ref 2.1–4.3)
POTASSIUM SERPL-SCNC: 4.2 MMOL/L (ref 3.5–5.3)
PROT SERPL-MCNC: 7.5 G/DL (ref 6.1–8.1)
SODIUM SERPL-SCNC: 136 MMOL/L (ref 135–146)

## 2022-11-02 ENCOUNTER — HOSPITAL ENCOUNTER (OUTPATIENT)
Dept: NON INVASIVE DIAGNOSTICS | Facility: HOSPITAL | Age: 67
Discharge: HOME | End: 2022-11-02

## 2022-11-02 DIAGNOSIS — Z13.6 ENCOUNTER FOR SCREENING FOR VASCULAR DISEASE: ICD-10-CM

## 2022-11-15 ENCOUNTER — OFFICE VISIT (OUTPATIENT)
Dept: ENDOCRINOLOGY | Facility: HOSPITAL | Age: 67
End: 2022-11-15

## 2022-11-15 VITALS
BODY MASS INDEX: 26.84 KG/M2 | WEIGHT: 167 LBS | HEART RATE: 75 BPM | DIASTOLIC BLOOD PRESSURE: 80 MMHG | HEIGHT: 66 IN | SYSTOLIC BLOOD PRESSURE: 136 MMHG

## 2022-11-15 DIAGNOSIS — E11.42 DIABETIC POLYNEUROPATHY ASSOCIATED WITH TYPE 2 DIABETES MELLITUS (HCC): ICD-10-CM

## 2022-11-15 DIAGNOSIS — E11.65 TYPE 2 DIABETES MELLITUS WITH HYPERGLYCEMIA, WITHOUT LONG-TERM CURRENT USE OF INSULIN (HCC): Primary | ICD-10-CM

## 2022-11-15 DIAGNOSIS — E78.2 MIXED HYPERLIPIDEMIA: ICD-10-CM

## 2022-11-15 DIAGNOSIS — I10 PRIMARY HYPERTENSION: ICD-10-CM

## 2022-11-15 NOTE — PATIENT INSTRUCTIONS
hgbA1c is 6 4%  this is good despite yourself  Get back on track with the freestyle osman and eating properly;  Continue the same jaunmet, metformin, and pioglitazone  Call the insurance to find out where you can get the CGM and then we can order it  Follow up in 3 months with blood work

## 2022-11-15 NOTE — PROGRESS NOTES
11/16/2022    Assessment/Plan      Diagnoses and all orders for this visit:    Type 2 diabetes mellitus with hyperglycemia, without long-term current use of insulin ([facility] [address] )  -     HEMOGLOBIN A1C W/ EAG ESTIMATION Lab Collect; Future  -     Comprehensive metabolic panel Lab Collect; Future  -     CBC and differential Lab Collect; Future    Diabetic polyneuropathy associated with type 2 diabetes mellitus (HCC)  -     HEMOGLOBIN A1C W/ EAG ESTIMATION Lab Collect; Future  -     Comprehensive metabolic panel Lab Collect; Future  -     CBC and differential Lab Collect; Future    Primary hypertension  -     HEMOGLOBIN A1C W/ EAG ESTIMATION Lab Collect; Future  -     Comprehensive metabolic panel Lab Collect; Future  -     CBC and differential Lab Collect; Future    Mixed hyperlipidemia  -     HEMOGLOBIN A1C W/ EAG ESTIMATION Lab Collect; Future  -     Comprehensive metabolic panel Lab Collect; Future  -     CBC and differential Lab Collect; Future        Assessment/Plan:  1  Type 2 diabetes  Hemoglobin A1c is 6 4%  Despite her not using the Dexcom regularly or following as good of a diet, her A1c is quite good  She is going to get back on track with eating properly and utilizing her freestyle Sweta more often  For now, she will continue the same Janumet, metformin, and pioglitazone  2  Diabetic neuropathy  She has occasional numbness is sitting too long  Diabetic foot exams are up-to-date  3  Hypertension  She is normotensive in the office on her current dose of valsartan/HCTZ  4  Hyperlipidemia  She will continue the same atorvastatin 40 mg daily  I have asked her to follow up in 3 months with preceding hemoglobin A1c, CMP, and CBC  CC: Diabetes type 2, lipid, blood pressure follow-up    History of Present Illness     HPI: Johana Lyn is a 77y o  year old female with type 2 diabetes with neuropathy for 18 years, hypertension, hyperlipidemia for follow-up visit    She is on oral agents at home and takes pioglitazone 45 mg daily, Janumet /1000 mg daily and metformin  mg 2 tablets at supper  She denies any polyphagia, polydipsia,  and blurry vision  She has polyuria and once a night nocturia  She is occasional numbness of the feet if she sits too long  She denies chest pain or shortness of breath  She denies nephropathy, retinopathy, heart attack, stroke and claudication but does admit to neuropathy  Hypoglycemic episodes: No never  The patient's last eye exam was in May 2022 with no retinopathy  The patient's last foot exam was in January 2022 at endocrine office visit  She does not see Podiatry  Last A1C was   Lab Results   Component Value Date    HGBA1C 6 4 (H) 10/28/2022     Blood Sugar/Glucometer/Pump/CGM review: was not wearing the freestyle osman for a while  Was off it at least 1 month  She is back on for 3 days  Freestyle download from 11/02/2022 through 11/15/2022 was reviewed the office  She had a CGM active 21% of the time with an average glucose of 132 mg/dL  Glucose variability is 21 6%  94% of blood sugars are in target range, 6% high, 0% very high, 0% low, and 0% very low  She has hyperlipidemia and takes atorvastatin 40 mg daily and fenofibrate 54 mg daily  She denies chest pain or shortness of breath    She has hypertension and takes valsartan/HCTZ 320/25 mg daily  She denies headache or stroke-like symptoms  Review of Systems   Constitutional: Negative for fatigue and unexpected weight change  Weight 8 lb more than last visit  Will need to eat snacks through out the day with some hunger  HENT: Negative for trouble swallowing  Eyes: Negative for visual disturbance  Wears glasses  Respiratory: Negative for chest tightness and shortness of breath  Cardiovascular: Negative for chest pain and leg swelling  Gastrointestinal: Negative for abdominal pain, constipation, diarrhea and nausea     Endocrine: Positive for polyuria  Negative for polydipsia and polyphagia  Nocturia  Once a night  Skin: Negative for wound  Neurological: Positive for numbness  Negative for dizziness, weakness, light-headedness and headaches  Occasional numbness when sitting too long  Psychiatric/Behavioral: Negative for sleep disturbance         Historical Information   Past Medical History:   Diagnosis Date   • Bicornate uterus    • GERD (gastroesophageal reflux disease)    • Hyperlipidemia    • Hypertension      Past Surgical History:   Procedure Laterality Date   •  SECTION      X 2   • CHOLECYSTECTOMY      lap   • TUBAL LIGATION Bilateral      Social History   Social History     Substance and Sexual Activity   Alcohol Use Yes   • Alcohol/week: 4 0 standard drinks   • Types: 2 Glasses of wine, 2 Cans of beer per week     Social History     Substance and Sexual Activity   Drug Use No     Social History     Tobacco Use   Smoking Status Former   • Packs/day: 0 25   • Years: 10 00   • Pack years: 2 50   • Types: Cigarettes   • Start date: 1975   • Quit date: 1985   • Years since quittin 8   Smokeless Tobacco Never     Family History:   Family History   Problem Relation Age of Onset   • Myocarditis Mother    • Melanoma Father    • Diabetes type II Father    • Stroke Father    • Diabetes type II Sister    • Hypertension Sister    • No Known Problems Daughter    • Breast cancer Maternal Grandmother          at an early age   • No Known Problems Maternal Grandfather    • No Known Problems Paternal Grandmother    • No Known Problems Paternal Grandfather    • No Known Problems Daughter    • Breast cancer Maternal Aunt         unknown age of onset   • No Known Problems Maternal Aunt    • No Known Problems Paternal Aunt    • Hypertension Brother    • Diabetes type II Brother    • Diabetes type II Paternal Uncle        Meds/Allergies   Current Outpatient Medications   Medication Sig Dispense Refill   • Ascorbic Acid (Vitamin C) 500 MG CAPS Take by mouth in the morning     • aspirin (ECOTRIN LOW STRENGTH) 81 mg EC tablet Take 81 mg by mouth daily     • atorvastatin (LIPITOR) 40 mg tablet Take 40 mg by mouth in the morning       • FENOFIBRATE MICRONIZED PO Take 54 mg by mouth in the morning       • Janumet -1000 MG TB24 Take 1 tablet by mouth in the morning 30 tablet 5   • Magnesium Cl-Calcium Carbonate (SLOW-MAG PO) Take by mouth in the morning     • metFORMIN (GLUCOPHAGE-XR) 500 mg 24 hr tablet Take 2 tablets (1,000 mg total) by mouth daily with dinner     • MISC NATURAL PRODUCTS EX Apply topically Magnesium cream to legs at night for leg cramps     • Multiple Vitamins-Minerals (Multivitamin Gummies Womens) CHEW Chew 1 each daily     • Nutritional Supplements (CANDIDA COMPLEX PO) Take by mouth Candida support supplement daily     • omeprazole (PriLOSEC) 20 mg delayed release capsule Take 20 mg by mouth daily       • pioglitazone (ACTOS) 45 mg tablet Take 45 mg by mouth daily       • valsartan-hydrochlorothiazide (DIOVAN-HCT) 320-25 MG per tablet Take 1 tablet by mouth daily     • vitamin B-12 (VITAMIN B-12) 1,000 mcg tablet Take 1 tablet (1,000 mcg total) by mouth daily     • zinc gluconate 50 mg tablet Take 50 mg by mouth daily       No current facility-administered medications for this visit  Allergies   Allergen Reactions   • Jardiance [Empagliflozin] Other (See Comments)     Frequent yeast infections   • Iodinated Diagnostic Agents Rash       Objective   Vitals: Blood pressure 136/80, pulse 75, height 5' 6" (1 676 m), weight 75 8 kg (167 lb)  Invasive Devices     None                 Physical Exam  Vitals reviewed  Constitutional:       Appearance: Normal appearance  She is well-developed  HENT:      Head: Normocephalic and atraumatic  Eyes:      Conjunctiva/sclera: Conjunctivae normal    Neck:      Thyroid: No thyromegaly  Vascular: No carotid bruit        Comments: Thyroid normal in size   Cardiovascular:      Rate and Rhythm: Normal rate and regular rhythm  Heart sounds: Normal heart sounds  No murmur heard  Pulmonary:      Effort: Pulmonary effort is normal       Breath sounds: Normal breath sounds  No wheezing  Abdominal:      Palpations: Abdomen is soft  Musculoskeletal:         General: No deformity  Normal range of motion  Cervical back: Normal range of motion and neck supple  Left lower leg: No edema  Lymphadenopathy:      Cervical: No cervical adenopathy  Skin:     General: Skin is warm and dry  Findings: No rash  Neurological:      Mental Status: She is alert and oriented to person, place, and time  Deep Tendon Reflexes: Reflexes are normal and symmetric  The history was obtained from the review of the chart and from the patient  Lab Results:    Most recent Alc is  Lab Results   Component Value Date    HGBA1C 6 4 (H) 10/28/2022           Blood work done on 10/28/2022 showed a CMP with a glucose of 133 fasting, BUN 29, BUN/creatinine ratio 42, but was otherwise normal     Phosphorus is 4 3, Magnesium is 2  Lab Results   Component Value Date    CREATININE 0 69 10/28/2022    CREATININE 0 60 07/12/2022    CREATININE 0 63 03/15/2022    BUN 29 (H) 10/28/2022    K 4 2 10/28/2022     10/28/2022    CO2 27 10/28/2022     eGFR   Date Value Ref Range Status   10/28/2022 96 > OR = 60 mL/min/1 73m2 Final     Comment:     The eGFR is based on the CKD-EPI 2021 equation  To calculate   the new eGFR from a previous Creatinine or Cystatin C  result, go to Jonna at  [email or web address]  [email or web address]           Lab Results   Component Value Date    HDL 69 07/12/2022    TRIG 59 07/12/2022       Lab Results   Component Value Date    ALT 12 10/28/2022    AST 10 10/28/2022    ALKPHOS 30 (L) 10/28/2022       Lab Results   Component Value Date    TSH 3 35 03/15/2022             Future Appointments   Date Time Provider Yamila Coats 11/21/2022  1:30 PM [facility] UP 1 [facility] UP Mammo [facility] PER   3/6/2023  9:20 AM Amelia Mackey MD [facility]

## 2022-11-21 ENCOUNTER — HOSPITAL ENCOUNTER (OUTPATIENT)
Dept: MAMMOGRAPHY | Facility: CLINIC | Age: 67
Discharge: HOME | End: 2022-11-21

## 2022-11-21 VITALS — HEIGHT: 66 IN | BODY MASS INDEX: 26.84 KG/M2 | WEIGHT: 167 LBS

## 2022-11-21 DIAGNOSIS — Z12.31 ENCOUNTER FOR SCREENING MAMMOGRAM FOR MALIGNANT NEOPLASM OF BREAST: ICD-10-CM

## 2023-01-01 NOTE — PROGRESS NOTES
4/14/2022    Assessment/Plan      Diagnoses and all orders for this visit:    Type 2 diabetes mellitus with hyperglycemia, without long-term current use of insulin ([facility] )  -     HEMOGLOBIN A1C W/ EAG ESTIMATION Lab Collect; Future  -     Comprehensive metabolic panel Lab Collect; Future  -     Lipid Panel with Direct LDL reflex Lab Collect; Future  -     Janumet -1000 MG TB24; Take 1 tablet by mouth in the morning    Diabetic polyneuropathy associated with type 2 diabetes mellitus ([facility] )  -     HEMOGLOBIN A1C W/ EAG ESTIMATION Lab Collect; Future  -     Comprehensive metabolic panel Lab Collect; Future  -     Lipid Panel with Direct LDL reflex Lab Collect; Future    Primary hypertension  -     HEMOGLOBIN A1C W/ EAG ESTIMATION Lab Collect; Future  -     Comprehensive metabolic panel Lab Collect; Future  -     Lipid Panel with Direct LDL reflex Lab Collect; Future    Mixed hyperlipidemia  -     HEMOGLOBIN A1C W/ EAG ESTIMATION Lab Collect; Future  -     Comprehensive metabolic panel Lab Collect; Future  -     Lipid Panel with Direct LDL reflex Lab Collect; Future        Assessment/Plan:  1  Type 2 diabetes  Hemoglobin A1c is 7 2%  This is significantly improved  For now, she will continue the same Janumet XR, metformin XR, and pioglitazone  She will continue to utilize her freestyle Sweta continuous glucose monitoring system  She will continue to work on dietary changes    2  Diabetic neuropathy  She has unchanged neuropathic symptoms diabetic foot exams are up-to-date  3  Hypertension  She is normotensive in the office on her current dose of valsartan/HCTZ  4  Hyperlipidemia  She will continue the same atorvastatin and fenofibrate  I will check a lipid profile with her next visit  I have asked her to follow up in 3 months with preceding hemoglobin A1c, CMP, and lipid panel        CC: Diabetes type 2, blood pressure, lipid follow-up    History of Present Illness     HPI: Saige ro a 77y o  year old female with type 2 diabetes with neuropathy for 18 years, hypertension, hyperlipidemia for follow-up visit  She is on oral agents at home and takes pioglitazone 45 mg daily, Janumet /1000 mg daily, and metformin XR 1000 mg with supper  She to polyuria, polyphagia before supper, and, nocturia once a night  She denies polydipsia  and blurry vision  She has unchanged numbness and tingling of the feet  She denies chest pain or shortness of breath  She denies nephropathy, retinopathy, heart attack, stroke and claudication but does admit to neuropathy  Has seen diabetes education and has adjusted the diet to decrease the carbs  Hypoglycemic episodes: No rare  The patient's last eye exam was in summer 2021 with no retinopathy  The patient's last foot exam was in January 2022 at endocrine office visit  She does not see Podiatry  Last A1C was   Lab Results   Component Value Date    HGBA1C 7 2 (H) 03/15/2022     Blood Sugar/Glucometer/Pump/CGM review:  She utilizes a freestyle Sweta 2 continuous glucose monitoring system to test her blood sugars frequently throughout the day  Freestyle Sweta 2 download from 04/01/2022 through 04/14/2022 was reviewed the office today  Average glucose is 126 mg/dL with glucose variability of 16 8%  99% of blood sugars are in target range, 1% high, 0% very high, 0% low, and 0% very low  Overall, her blood sugar record is quite good with very little variability and just a minor spike in blood sugars after breakfast     She has hyperlipidemia and takes atorvastatin 40 mg daily and fenofibrate 54 mg daily  She denies chest pain or shortness of breath  She has hypertension and takes valsartan/HCTZ 320/25 mg daily  She denies headache or stroke-like symptoms  Review of Systems   Constitutional: Negative for fatigue and unexpected weight change  Weight 3 lbs less than last visit  HENT: Negative for trouble swallowing      Eyes: Negative for visual disturbance  Respiratory: Negative for chest tightness and shortness of breath  Cardiovascular: Negative for chest pain and leg swelling  Gastrointestinal: Negative for abdominal pain, constipation, diarrhea and nausea  Endocrine: Positive for polyphagia and polyuria  Negative for polydipsia  Nocturia once a night  Some hunger before supper  Skin: Negative for wound  Neurological: Positive for numbness  Negative for dizziness, weakness, light-headedness and headaches  Some numbness and tingling unchanged  Psychiatric/Behavioral: Positive for sleep disturbance  Can be late falling asleep at times          Historical Information   Past Medical History:   Diagnosis Date    Bicornate uterus     GERD (gastroesophageal reflux disease)     Hyperlipidemia     Hypertension      Past Surgical History:   Procedure Laterality Date     SECTION      X 2    CHOLECYSTECTOMY      lap    TUBAL LIGATION Bilateral      Social History   Social History     Substance and Sexual Activity   Alcohol Use Yes    Alcohol/week: 4 0 standard drinks    Types: 2 Glasses of wine, 2 Cans of beer per week     Social History     Substance and Sexual Activity   Drug Use No     Social History     Tobacco Use   Smoking Status Former Smoker    Packs/day: 0 25    Years: 10 00    Pack years: 2 50    Types: Cigarettes    Start date: 1975   [facility] Quit date: 1985    Years since quittin 3   Smokeless Tobacco Never Used     Family History:   Family History   Problem Relation Age of Onset    Myocarditis Mother     Melanoma Father     Diabetes type II Father     Stroke Father     Diabetes type II Sister     Hypertension Sister     No Known Problems Daughter     Breast cancer Maternal Grandmother          at an early age   [facility] No Known Problems Maternal Grandfather     No Known Problems Paternal Grandmother     No Known Problems Paternal Grandfather     No Known Problems Daughter     Breast cancer Maternal Aunt         unknown age of onset   [facility] Woodard No Known Problems Maternal Aunt     No Known Problems Paternal Aunt     Hypertension Brother     Diabetes type II Brother     Diabetes type II Paternal Uncle        Meds/Allergies   Current Outpatient Medications   Medication Sig Dispense Refill    Ascorbic Acid (Vitamin C) 500 MG CAPS Take by mouth in the morning      aspirin (ECOTRIN LOW STRENGTH) 81 mg EC tablet Take 81 mg by mouth daily      atorvastatin (LIPITOR) 40 mg tablet Take 40 mg by mouth in the morning        FENOFIBRATE MICRONIZED PO Take 54 mg by mouth in the morning        Janumet -1000 MG TB24 Take 1 tablet by mouth in the morning 30 tablet 5    Magnesium Cl-Calcium Carbonate (SLOW-MAG PO) Take by mouth in the morning      metFORMIN (GLUCOPHAGE-XR) 500 mg 24 hr tablet Take 2 tablets (1,000 mg total) by mouth daily with dinner      MISC NATURAL PRODUCTS EX Apply topically Magnesium cream to legs at night for leg cramps      Multiple Vitamins-Minerals (Multivitamin Gummies Womens) CHEW Chew 1 each daily      Nutritional Supplements (CANDIDA COMPLEX PO) Take by mouth Candida support supplement daily      omeprazole (PriLOSEC) 20 mg delayed release capsule Take 20 mg by mouth daily        pioglitazone (ACTOS) 45 mg tablet Take 45 mg by mouth daily        valsartan-hydrochlorothiazide (DIOVAN-HCT) 320-25 MG per tablet Take 1 tablet by mouth daily      vitamin B-12 (VITAMIN B-12) 1,000 mcg tablet Take 1 tablet (1,000 mcg total) by mouth daily      zinc gluconate 50 mg tablet Take 50 mg by mouth daily       No current facility-administered medications for this visit       Allergies   Allergen Reactions    Jardiance [Empagliflozin] Other (See Comments)     Frequent yeast infections    Iodinated Diagnostic Agents Rash       Objective   Vitals: Blood pressure 120/68, pulse 94, height 5' 6" (1 676 m), weight 72 8 kg (160 lb 6 4 oz), SpO2 98 %   Invasive Devices  Report    None                 Physical Exam  Vitals reviewed  Constitutional:       Appearance: Normal appearance  She is well-developed  HENT:      Head: Normocephalic and atraumatic  Eyes:      Conjunctiva/sclera: Conjunctivae normal    Neck:      Thyroid: No thyromegaly  Vascular: No carotid bruit  Comments: Thyroid normal in size  Cardiovascular:      Rate and Rhythm: Normal rate and regular rhythm  Heart sounds: Normal heart sounds  No murmur heard  Pulmonary:      Effort: Pulmonary effort is normal       Breath sounds: Normal breath sounds  No wheezing  Abdominal:      Palpations: Abdomen is soft  Musculoskeletal:         General: No deformity  Normal range of motion  Cervical back: Normal range of motion and neck supple  Right lower leg: No edema  Left lower leg: No edema  Lymphadenopathy:      Cervical: No cervical adenopathy  Skin:     General: Skin is warm and dry  Findings: No rash  Neurological:      Mental Status: She is alert and oriented to person, place, and time  Deep Tendon Reflexes: Reflexes are normal and symmetric  The history was obtained from the review of the chart and from the patient  Lab Results:    Most recent Alc is  Lab Results   Component Value Date    HGBA1C 7 2 (H) 03/15/2022           Blood work done on 03/15/2022 showed a CMP with a glucose of 111, BUN 27, BUN/creatinine ratio 43, but was otherwise normal     Lab Results   Component Value Date    CREATININE 0 63 03/15/2022    BUN 27 (H) 03/15/2022    K 4 0 03/15/2022     03/15/2022    CO2 29 03/15/2022         Lab Results   Component Value Date    ALT 13 03/15/2022    AST 13 03/15/2022    ALKPHOS 28 (L) 03/15/2022       Lab Results   Component Value Date    TSH 3 35 03/15/2022     Urine microalbumin to creatinine ratio is 6        Future Appointments   Date Time Provider Yamila Holdenisti   7/19/2022  9:00 AM Deitra Mortimer, MD ENDO [address] Ampicillin

## 2023-01-24 ENCOUNTER — DOCUMENTATION (OUTPATIENT)
Dept: ENDOCRINOLOGY | Facility: HOSPITAL | Age: 68
End: 2023-01-24

## 2023-01-25 ENCOUNTER — DOCUMENTATION (OUTPATIENT)
Dept: ENDOCRINOLOGY | Facility: HOSPITAL | Age: 68
End: 2023-01-25

## 2023-01-26 NOTE — PROGRESS NOTES
Received call from patient's insurance, coverage was not granted, a formal denial will be sent to the office

## 2023-02-15 DIAGNOSIS — E11.65 TYPE 2 DIABETES MELLITUS WITH HYPERGLYCEMIA, WITHOUT LONG-TERM CURRENT USE OF INSULIN (HCC): ICD-10-CM

## 2023-02-15 RX ORDER — SITAGLIPTIN AND METFORMIN HYDROCHLORIDE 1000; 100 MG/1; MG/1
TABLET, FILM COATED, EXTENDED RELEASE ORAL
Qty: 30 TABLET | Refills: 5 | Status: SHIPPED | OUTPATIENT
Start: 2023-02-15

## 2023-03-03 LAB
ALBUMIN SERPL-MCNC: 4.8 G/DL (ref 3.6–5.1)
ALBUMIN/GLOB SERPL: 1.8 (CALC) (ref 1–2.5)
ALP SERPL-CCNC: 30 U/L (ref 37–153)
ALT SERPL-CCNC: 19 U/L (ref 6–29)
AST SERPL-CCNC: 15 U/L (ref 10–35)
BASOPHILS # BLD AUTO: 41 CELLS/UL (ref 0–200)
BASOPHILS NFR BLD AUTO: 0.8 %
BILIRUB SERPL-MCNC: 0.4 MG/DL (ref 0.2–1.2)
BUN SERPL-MCNC: 21 MG/DL (ref 7–25)
BUN/CREAT SERPL: ABNORMAL (CALC) (ref 6–22)
CALCIUM SERPL-MCNC: 9.8 MG/DL (ref 8.6–10.4)
CHLORIDE SERPL-SCNC: 101 MMOL/L (ref 98–110)
CO2 SERPL-SCNC: 26 MMOL/L (ref 20–32)
CREAT SERPL-MCNC: 0.6 MG/DL (ref 0.5–1.05)
EOSINOPHIL # BLD AUTO: 209 CELLS/UL (ref 15–500)
EOSINOPHIL NFR BLD AUTO: 4.1 %
ERYTHROCYTE [DISTWIDTH] IN BLOOD BY AUTOMATED COUNT: 13.8 % (ref 11–15)
EST. AVERAGE GLUCOSE BLD GHB EST-MCNC: 146 MG/DL
EST. AVERAGE GLUCOSE BLD GHB EST-SCNC: 8.1 MMOL/L
GFR/BSA.PRED SERPLBLD CYS-BASED-ARV: 98 ML/MIN/1.73M2
GLOBULIN SER CALC-MCNC: 2.7 G/DL (CALC) (ref 1.9–3.7)
GLUCOSE SERPL-MCNC: 138 MG/DL (ref 65–99)
HBA1C MFR BLD: 6.7 % OF TOTAL HGB
HCT VFR BLD AUTO: 33.7 % (ref 35–45)
HGB BLD-MCNC: 11 G/DL (ref 11.7–15.5)
LYMPHOCYTES # BLD AUTO: 1729 CELLS/UL (ref 850–3900)
LYMPHOCYTES NFR BLD AUTO: 33.9 %
MCH RBC QN AUTO: 28.8 PG (ref 27–33)
MCHC RBC AUTO-ENTMCNC: 32.6 G/DL (ref 32–36)
MCV RBC AUTO: 88.2 FL (ref 80–100)
MONOCYTES # BLD AUTO: 505 CELLS/UL (ref 200–950)
MONOCYTES NFR BLD AUTO: 9.9 %
NEUTROPHILS # BLD AUTO: 2616 CELLS/UL (ref 1500–7800)
NEUTROPHILS NFR BLD AUTO: 51.3 %
PLATELET # BLD AUTO: 391 THOUSAND/UL (ref 140–400)
PMV BLD REES-ECKER: 10 FL (ref 7.5–12.5)
POTASSIUM SERPL-SCNC: 4.1 MMOL/L (ref 3.5–5.3)
PROT SERPL-MCNC: 7.5 G/DL (ref 6.1–8.1)
RBC # BLD AUTO: 3.82 MILLION/UL (ref 3.8–5.1)
SODIUM SERPL-SCNC: 138 MMOL/L (ref 135–146)
WBC # BLD AUTO: 5.1 THOUSAND/UL (ref 3.8–10.8)

## 2023-03-06 ENCOUNTER — TELEPHONE (OUTPATIENT)
Dept: ADMINISTRATIVE | Facility: OTHER | Age: 68
End: 2023-03-06

## 2023-03-06 ENCOUNTER — OFFICE VISIT (OUTPATIENT)
Dept: ENDOCRINOLOGY | Facility: HOSPITAL | Age: 68
End: 2023-03-06

## 2023-03-06 VITALS
DIASTOLIC BLOOD PRESSURE: 80 MMHG | SYSTOLIC BLOOD PRESSURE: 132 MMHG | WEIGHT: 170.8 LBS | BODY MASS INDEX: 27.45 KG/M2 | HEART RATE: 88 BPM | HEIGHT: 66 IN

## 2023-03-06 DIAGNOSIS — E11.42 DIABETIC POLYNEUROPATHY ASSOCIATED WITH TYPE 2 DIABETES MELLITUS (HCC): ICD-10-CM

## 2023-03-06 DIAGNOSIS — E78.2 MIXED HYPERLIPIDEMIA: ICD-10-CM

## 2023-03-06 DIAGNOSIS — E11.65 TYPE 2 DIABETES MELLITUS WITH HYPERGLYCEMIA, WITHOUT LONG-TERM CURRENT USE OF INSULIN (HCC): Primary | ICD-10-CM

## 2023-03-06 DIAGNOSIS — I10 PRIMARY HYPERTENSION: ICD-10-CM

## 2023-03-06 RX ORDER — DOXYCYCLINE HYCLATE 50 MG/1
324 CAPSULE, GELATIN COATED ORAL
COMMUNITY

## 2023-03-06 NOTE — PATIENT INSTRUCTIONS
Hgba1c is 6 7%  this is still excellent  No change in pioglitazone, janumet or metformin  Continue to use the freestyle osman and work on [employer]  Follow up with Dr Theron Riggins about the mild anemia for further evaluation  Follow up in 3 months with blood work

## 2023-03-06 NOTE — TELEPHONE ENCOUNTER
Upon review of the In Basket request we were able to locate, review, and update the patient chart as requested for Diabetic Eye Exam     Any additional questions or concerns should be emailed to the Practice Liaisons via the appropriate education email address, please do not reply via In Basket      Thank you  Vin Asher MA

## 2023-03-06 NOTE — TELEPHONE ENCOUNTER
----- Message from Henrik Zuniga sent at 3/6/2023  9:54 AM EST -----  Regarding: diabetic eye exam  03/06/23 9:54 AM    Hello, our patient Collette Dunn has had a diabetic eye exam completed/performed  Please assist in updating the patient chart by Dr Mayur Vizcaino at [facility]  The date of service was a few months ago      Thank you,  VALENTINA Zuniga  [facility] [place]

## 2023-07-12 LAB
ALBUMIN SERPL-MCNC: 4.4 G/DL (ref 3.6–5.1)
ALBUMIN/CREAT UR: 7 MCG/MG CREAT
ALBUMIN/GLOB SERPL: 1.9 (CALC) (ref 1–2.5)
ALP SERPL-CCNC: 30 U/L (ref 37–153)
ALT SERPL-CCNC: 15 U/L (ref 6–29)
AST SERPL-CCNC: 11 U/L (ref 10–35)
BILIRUB SERPL-MCNC: 0.4 MG/DL (ref 0.2–1.2)
BUN SERPL-MCNC: 20 MG/DL (ref 7–25)
BUN/CREAT SERPL: ABNORMAL (CALC) (ref 6–22)
CALCIUM SERPL-MCNC: 9.8 MG/DL (ref 8.6–10.4)
CHLORIDE SERPL-SCNC: 100 MMOL/L (ref 98–110)
CO2 SERPL-SCNC: 28 MMOL/L (ref 20–32)
CREAT SERPL-MCNC: 0.66 MG/DL (ref 0.5–1.05)
CREAT UR-MCNC: 42 MG/DL (ref 20–275)
EST. AVERAGE GLUCOSE BLD GHB EST-MCNC: 154 MG/DL
EST. AVERAGE GLUCOSE BLD GHB EST-SCNC: 8.5 MMOL/L
GFR/BSA.PRED SERPLBLD CYS-BASED-ARV: 96 ML/MIN/1.73M2
GLOBULIN SER CALC-MCNC: 2.3 G/DL (CALC) (ref 1.9–3.7)
GLUCOSE SERPL-MCNC: 148 MG/DL (ref 65–99)
HBA1C MFR BLD: 7 % OF TOTAL HGB
MICROALBUMIN UR-MCNC: 0.3 MG/DL
POTASSIUM SERPL-SCNC: 4.1 MMOL/L (ref 3.5–5.3)
PROT SERPL-MCNC: 6.7 G/DL (ref 6.1–8.1)
SODIUM SERPL-SCNC: 137 MMOL/L (ref 135–146)
TSH SERPL-ACNC: 3.18 MIU/L (ref 0.4–4.5)

## 2023-07-14 ENCOUNTER — OFFICE VISIT (OUTPATIENT)
Dept: ENDOCRINOLOGY | Facility: HOSPITAL | Age: 68
End: 2023-07-14
Payer: COMMERCIAL

## 2023-07-14 VITALS
HEART RATE: 92 BPM | WEIGHT: 172 LBS | DIASTOLIC BLOOD PRESSURE: 74 MMHG | HEIGHT: 66 IN | BODY MASS INDEX: 27.64 KG/M2 | SYSTOLIC BLOOD PRESSURE: 122 MMHG

## 2023-07-14 DIAGNOSIS — E78.2 MIXED HYPERLIPIDEMIA: ICD-10-CM

## 2023-07-14 DIAGNOSIS — E11.65 TYPE 2 DIABETES MELLITUS WITH HYPERGLYCEMIA, WITHOUT LONG-TERM CURRENT USE OF INSULIN (HCC): Primary | ICD-10-CM

## 2023-07-14 DIAGNOSIS — E11.42 DIABETIC POLYNEUROPATHY ASSOCIATED WITH TYPE 2 DIABETES MELLITUS (HCC): ICD-10-CM

## 2023-07-14 DIAGNOSIS — I10 PRIMARY HYPERTENSION: ICD-10-CM

## 2023-07-14 PROCEDURE — 99214 OFFICE O/P EST MOD 30 MIN: CPT | Performed by: INTERNAL MEDICINE

## 2023-07-14 PROCEDURE — 95251 CONT GLUC MNTR ANALYSIS I&R: CPT | Performed by: INTERNAL MEDICINE

## 2023-07-14 RX ORDER — LANOLIN ALCOHOL/MO/W.PET/CERES
2000 CREAM (GRAM) TOPICAL DAILY
Status: SHIPPED
Start: 2023-07-14

## 2023-07-14 RX ORDER — DOXYCYCLINE HYCLATE 100 MG/1
CAPSULE ORAL
COMMUNITY
Start: 2023-07-13

## 2023-07-14 RX ORDER — NITROFURANTOIN MACROCRYSTALS 50 MG/1
CAPSULE ORAL
COMMUNITY
Start: 2023-04-06

## 2023-07-14 NOTE — PROGRESS NOTES
7/14/2023    Assessment/Plan      Diagnoses and all orders for this visit:    Type 2 diabetes mellitus with hyperglycemia, without long-term current use of insulin ([address])  -     HEMOGLOBIN A1C W/ EAG ESTIMATION Lab Collect; Future  -     Comprehensive metabolic panel Lab Collect; Future  -     CBC and differential Lab Collect; Future  -     Lipid Panel with Direct LDL reflex Lab Collect; Future  -     vitamin B-12 (VITAMIN B-12) 1,000 mcg tablet; Take 2 tablets (2,000 mcg total) by mouth daily    Diabetic polyneuropathy associated with type 2 diabetes mellitus ([address])  -     HEMOGLOBIN A1C W/ EAG ESTIMATION Lab Collect; Future  -     Comprehensive metabolic panel Lab Collect; Future  -     CBC and differential Lab Collect; Future  -     Lipid Panel with Direct LDL reflex Lab Collect; Future  -     vitamin B-12 (VITAMIN B-12) 1,000 mcg tablet; Take 2 tablets (2,000 mcg total) by mouth daily    Primary hypertension  -     HEMOGLOBIN A1C W/ EAG ESTIMATION Lab Collect; Future  -     Comprehensive metabolic panel Lab Collect; Future  -     CBC and differential Lab Collect; Future  -     Lipid Panel with Direct LDL reflex Lab Collect; Future    Mixed hyperlipidemia  -     HEMOGLOBIN A1C W/ EAG ESTIMATION Lab Collect; Future  -     Comprehensive metabolic panel Lab Collect; Future  -     CBC and differential Lab Collect; Future  -     Lipid Panel with Direct LDL reflex Lab Collect; Future    Other orders  -     doxycycline hyclate (VIBRAMYCIN) 100 mg capsule; prn  -     nitrofurantoin (MACRODANTIN) 50 mg capsule; prn  -     Multiple Vitamins-Minerals (PRESERVISION AREDS PO); Take by mouth in the morning        Assessment/Plan:  1. Type 2 diabetes. Hemoglobin A1c is 7%. This is a bit higher than in the past but not bad. She will continue to work on diet and exercise and decrease the carbohydrates in her diet and adding a protein snack at night to see if that keeps her blood sugars down in the morning.   For now, she will continue the same Janumet, metformin, and pioglitazone. She will continue to utilize her freestyle osman 2 continuous glucose monitoring system. 2.  Diabetic neuropathy. She has finger neuropathic symptoms. She does have a vitamin B12 level that is low normal and since metformin can interfere with absorption, I have asked her to increase her vitamin B12 to 2000 mcg daily to see if that improves symptoms. 3.  Hypertension. She is normotensive in the office on her current dose of valsartan/HCTZ. 4.  Hyperlipidemia. She will continue the same atorvastatin 40 mg daily and fenofibrate 54 mg daily. I will repeat a lipid panel with her next visit. I have asked her to follow-up in 3 months with preceding hemoglobin A1c, CMP, CBC, and lipid panel. CC: Diabetes type II, blood pressure, lipid follow-up    History of Present Illness     HPI: Ephraim Blizzard is a 79y.o. year old female with type 2 diabetes with neuropathy for 19 years, hypertension, hyperlipidemia for follow-up visit. She is on oral agents at home and takes metformin  mg 2 at supper, pioglitazone 45 mg daily, and Janumet /1000 mg in the morning. She denies any polyuria, polydipsia,  and blurry vision. She has polyphasia and once or twice a night nocturia. She denies numbness or tingling of the feet. She denies chest pain or shortness of breath. She denies nephropathy, retinopathy, heart attack, stroke and claudication but does admit to neuropathy. Was eating off the diet over the holiday weekend.  hgba1c is up a bit. Hypoglycemic episodes: Yes rare. The patient's last eye exam was in November 2022 with no retiopathy. The patient's last foot exam was in March 2023 at endocrine office visit. Last A1C was   Lab Results   Component Value Date    HGBA1C 7.0 (H) 07/11/2023   . Blood Sugar/Glucometer/Pump/CGM review: not using the freestyle osman as much recently.   She utilizes freestyle osman 2 continuous glucose monitoring system to test her blood sugars throughout the day. Freestyle osman download from 7/1/2023 through 7/14/2023 was reviewed in the office today. CGM was active only 39% of the time as she just started reusing her freestyle osman. Average glucose is 125 mg/dL with a glucose variability of 20.7%. 96% of blood sugars are in target range, 4% high, 0% very high, 0% low, and 0% very low. Sugars tend to be higher in the am.tried some protein snacking at night. Helped a little from 140-120. She has hypertension and takes valsartan/HCTZ 320/25 mg daily. She denies headaches to any significant extent. She has no strokelike symptoms. She has hyperlipidemia and takes atorvastatin 40 mg daily and fenofibrate 54 mg daily. She denies chest pain or shortness of breath. Review of Systems   Constitutional: Positive for fatigue. Negative for unexpected weight change. Weight 2 pounds more than March 2023. a bit fatigued in  the am.    HENT: Negative for trouble swallowing. Eyes: Negative for visual disturbance. Wears glasses. Respiratory: Negative for chest tightness and shortness of breath. Cardiovascular: Negative for chest pain. Gastrointestinal: Positive for diarrhea. Negative for abdominal pain, constipation and nausea. Still episode of diarrhea in am.    Endocrine: Positive for polyphagia. Negative for polydipsia and polyuria. Nocturia 1-2 times  A Night. Hunger at times, will graze during the day with lower sugars. Musculoskeletal:        Had plantar fasciitis on the right. Got an injection. Skin: Negative for wound. Neurological: Positive for numbness and headaches. Negative for dizziness, weakness and light-headedness. Left tips of pinky and ring finger numbness and tingling. Rare headaches. Hematological:        Now with some anemia. Has colonoscopy next week. Was using a lot of NSAID'S with the plantar fasciitis. Psychiatric/Behavioral: Negative for sleep disturbance.        Historical Information   Past Medical History:   Diagnosis Date   • Bicornate uterus    • GERD (gastroesophageal reflux disease)    • Hyperlipidemia    • Hypertension    • Macular degeneration of left eye      Past Surgical History:   Procedure Laterality Date   •  SECTION      X 2   • CHOLECYSTECTOMY      lap   • TUBAL LIGATION Bilateral      Social History   Social History     Substance and Sexual Activity   Alcohol Use Yes   • Alcohol/week: 4.0 standard drinks of alcohol   • Types: 2 Glasses of wine, 2 Cans of beer per week     Social History     Substance and Sexual Activity   Drug Use No     Social History     Tobacco Use   Smoking Status Former   • Packs/day: 0.25   • Years: 10.00   • Total pack years: 2.50   • Types: Cigarettes   • Start date: 1975   • Quit date: 1985   • Years since quittin.5   Smokeless Tobacco Never     Family History:   Family History   Problem Relation Age of Onset   • Myocarditis Mother    • Melanoma Father    • Diabetes type II Father    • Stroke Father    • Diabetes type II Sister    • Hypertension Sister    • No Known Problems Daughter    • No Known Problems Daughter    • Breast cancer Maternal Grandmother          at an early age   • No Known Problems Maternal Grandfather    • No Known Problems Paternal Grandmother    • No Known Problems Paternal Grandfather    • Hypertension Brother    • Diabetes type II Brother    • Breast cancer Maternal Aunt         unknown age of onset   • No Known Problems Maternal Aunt    • No Known Problems Paternal Aunt    • Diabetes type II Paternal Uncle        Meds/Allergies   Current Outpatient Medications   Medication Sig Dispense Refill   • Ascorbic Acid (Vitamin C) 500 MG CAPS Take by mouth in the morning     • atorvastatin (LIPITOR) 40 mg tablet Take 40 mg by mouth in the morning       • doxycycline hyclate (VIBRAMYCIN) 100 mg capsule prn     • FENOFIBRATE MICRONIZED PO Take 54 mg by mouth in the morning       • ferrous gluconate (FERGON) 324 mg tablet Take 324 mg by mouth daily with breakfast     • Janumet -1000 MG TB24 take 1 tablet by mouth every morning 30 tablet 5   • Magnesium Cl-Calcium Carbonate (SLOW-MAG PO) Take by mouth in the morning     • metFORMIN (GLUCOPHAGE-XR) 500 mg 24 hr tablet Take 2 tablets (1,000 mg total) by mouth daily with dinner     • MISC NATURAL PRODUCTS EX Apply topically Magnesium cream to legs at night for leg cramps     • Multiple Vitamins-Minerals (Multivitamin Gummies Womens) CHEW Chew 1 each daily     • Multiple Vitamins-Minerals (PRESERVISION AREDS PO) Take by mouth in the morning     • nitrofurantoin (MACRODANTIN) 50 mg capsule prn     • pioglitazone (ACTOS) 45 mg tablet Take 45 mg by mouth daily       • Potassium 99 MG TABS Take by mouth     • valsartan-hydrochlorothiazide (DIOVAN-HCT) 320-25 MG per tablet Take 1 tablet by mouth daily     • vitamin B-12 (VITAMIN B-12) 1,000 mcg tablet Take 2 tablets (2,000 mcg total) by mouth daily     • zinc gluconate 50 mg tablet Take 50 mg by mouth daily     • omeprazole (PriLOSEC) 20 mg delayed release capsule Take 20 mg by mouth daily   (Patient not taking: Reported on 7/14/2023)       No current facility-administered medications for this visit. Allergies   Allergen Reactions   • Jardiance [Empagliflozin] Other (See Comments)     Frequent yeast infections   • Iodinated Contrast Media Rash       Objective   Vitals: Blood pressure 122/74, pulse 92, height 5' 6" (1.676 m), weight 78 kg (172 lb). Invasive Devices     None                 Physical Exam  Vitals reviewed. Constitutional:       Appearance: Normal appearance. She is well-developed. HENT:      Head: Normocephalic and atraumatic. Eyes:      Conjunctiva/sclera: Conjunctivae normal.   Neck:      Thyroid: No thyromegaly. Vascular: No carotid bruit. Comments: Thyroid normal in size.   Cardiovascular:      Rate and Rhythm: Normal rate and regular rhythm. Heart sounds: Normal heart sounds. No murmur heard. Pulmonary:      Effort: Pulmonary effort is normal.      Breath sounds: Normal breath sounds. No wheezing. Abdominal:      Palpations: Abdomen is soft. Musculoskeletal:         General: No deformity. Cervical back: Normal range of motion and neck supple. Right lower leg: No edema. Left lower leg: No edema. Lymphadenopathy:      Cervical: No cervical adenopathy. Skin:     General: Skin is warm and dry. Findings: No rash. Neurological:      Mental Status: She is alert and oriented to person, place, and time. Deep Tendon Reflexes: Reflexes are normal and symmetric. The history was obtained from the review of the chart and from the patient. Lab Results:    Most recent Alc is  Lab Results   Component Value Date    HGBA1C 7.0 (H) 07/11/2023           Blood work performed on 7/11/2023 showed a CMP with a glucose of 148 fasting but was otherwise normal.    Urine microalbumin to creatinine ratio is 7. CBC showed anemia with a hemoglobin of 10.6 and a hematocrit of 32.7 with normal indices. Vitamin B12 level was low normal at 230 and ferritin level is low normal at 19 with a normal iron of 73. TSH is 3.18. Lab Results   Component Value Date    CREATININE 0.66 07/11/2023    CREATININE 0.60 03/03/2023    CREATININE 0.69 10/28/2022    BUN 20 07/11/2023    K 4.1 07/11/2023     07/11/2023    CO2 28 07/11/2023     eGFR   Date Value Ref Range Status   07/11/2023 96 > OR = 60 mL/min/1.73m2 Final     Comment:     The eGFR is based on the CKD-EPI 2021 equation. To calculate   the new eGFR from a previous Creatinine or Cystatin C  result, go to [email or web address]. org/professionals/  [email or web address]           Lab Results   Component Value Date    HDL 69 07/12/2022    TRIG 59 07/12/2022       Lab Results   Component Value Date    ALT 15 07/11/2023    AST 11 07/11/2023 ALKPHOS 30 (L) 07/11/2023       Lab Results   Component Value Date    TSH 3.18 07/11/2023             Future Appointments   Date Time Provider [address]   11/9/2023 11:20 AM Hunter Redman, [address]

## 2023-07-14 NOTE — PATIENT INSTRUCTIONS
Hgba1c is 7.0%. this is slightly higher. Keep working on that and decrease the carb's and add the night time protein snack. Continue the same janumet, metformin, and pioglitazone. Continue to use the freestyle osman. Try going to vitamin B12 2000 mcg daily for the tingling. Follow up in 3 months with blood work. Please help pt schedule labs and appointment     Thank you     Nilsa Gibbs RN, BSN  [facility]

## 2023-09-12 ENCOUNTER — TRANSCRIBE ORDERS (OUTPATIENT)
Dept: SLEEP CENTER | Facility: CLINIC | Age: 68
End: 2023-09-12

## 2023-09-12 DIAGNOSIS — R06.83 SNORING: ICD-10-CM

## 2023-09-12 DIAGNOSIS — G47.19 EXCESSIVE DAYTIME SLEEPINESS: Primary | ICD-10-CM

## 2023-09-17 ENCOUNTER — HOSPITAL ENCOUNTER (OUTPATIENT)
Dept: SLEEP CENTER | Facility: CLINIC | Age: 68
Discharge: HOME | End: 2023-09-17
Payer: COMMERCIAL

## 2023-09-17 DIAGNOSIS — G47.19 EXCESSIVE DAYTIME SLEEPINESS: ICD-10-CM

## 2023-09-17 DIAGNOSIS — R06.83 SNORING: ICD-10-CM

## 2023-09-17 PROCEDURE — 95810 POLYSOM 6/> YRS 4/> PARAM: CPT

## 2023-09-18 ENCOUNTER — TELEPHONE (OUTPATIENT)
Dept: SLEEP CENTER | Facility: CLINIC | Age: 68
End: 2023-09-18

## 2023-09-18 NOTE — PROGRESS NOTES
Sleep Study Documentation    Pre-Sleep Study       Sleep testing procedure explained to patient:YES    Patient napped prior to study:NO    Caffeine:Dayshift worker after 12PM.  Caffeine use:NO    Alcohol:Dayshift workers after 5PM: Alcohol use:NO    Typical day for patient:YES       Study Documentation    Sleep Study Indications: Snore, Nocturnal choking, Excessive Daytime Sleepiness    Sleep Study: Diagnostic   Snore:Severe  Supplemental O2: no    O2 flow rate (L/min) range   O2 flow rate (L/min) final   Minimum SaO2 77%  Baseline SaO2 98%    EKG abnormalities: no     EEG abnormalities: no    Were abnormal behaviors in sleep observed:NO    Is Total Sleep Study Recording Time < 2 hours: N/A    Is Total Sleep Study Recording Time > 2 hours but study is incomplete: N/A    Is Total Sleep Study Recording Time 6 hours or more but sleep was not obtained: NO    Patient classification:       Post-Sleep Study    Medication used at bedtime or during sleep study:YES over the counter sleep aid    Patient reports time it took to fall asleep:greater than 60 minutes    Patient reports waking up during study:3 or more times. Patient reports returning to sleep in greater than 30 minutes. Patient reports sleeping less than 2 hours without dreaming. Does the Patient feel this is a typical night of sleep:worse than usual    Patient rated sleepiness: Very sleepy or tired    PAP treatment:no.

## 2023-09-18 NOTE — TELEPHONE ENCOUNTER
----- Message from Keila Portillo MD sent at 9/16/2023  9:08 AM EDT -----  Approved    ----- Message -----  From: Doreen Saldivar  Sent: 9/15/2023   2:52 PM EDT  To: Keila Portillo MD    This diagnostic sleep study needs approval.     If approved please sign and return to clerical pool. If denied please include reasons why. Also provide alternative testing if warranted. Please sign and return to clerical pool.

## 2023-09-20 ENCOUNTER — TELEPHONE (OUTPATIENT)
Dept: SLEEP CENTER | Facility: CLINIC | Age: 68
End: 2023-09-20

## 2023-09-20 NOTE — TELEPHONE ENCOUNTER
Returned call from patient and advised of sleep study results. Sleep study resulted and shows severe KERRY (AHI 57.9). Per study order, Dr. Finesse Bundy, internal medicine, requests follow up with sleep specialist.  Patient is scheduled for consultation with Dr. Ryan Hines in the [place] [facility] office on 11/10/23. Offered sooner consult in sleep center in [facility] on 9/23/23 but patient declined. States she prefers to stay in [facility].

## 2023-10-24 DIAGNOSIS — E11.65 TYPE 2 DIABETES MELLITUS WITH HYPERGLYCEMIA, WITHOUT LONG-TERM CURRENT USE OF INSULIN (HCC): ICD-10-CM

## 2023-10-24 RX ORDER — SITAGLIPTIN AND METFORMIN HYDROCHLORIDE 1000; 100 MG/1; MG/1
1 TABLET, FILM COATED, EXTENDED RELEASE ORAL EVERY MORNING
Qty: 30 TABLET | Refills: 3 | Status: SHIPPED | OUTPATIENT
Start: 2023-10-24

## 2023-11-09 LAB
ALBUMIN SERPL-MCNC: 4.6 G/DL (ref 3.6–5.1)
ALBUMIN/GLOB SERPL: 1.7 (CALC) (ref 1–2.5)
ALP SERPL-CCNC: 33 U/L (ref 37–153)
ALT SERPL-CCNC: 15 U/L (ref 6–29)
AST SERPL-CCNC: 12 U/L (ref 10–35)
BASOPHILS # BLD AUTO: 43 CELLS/UL (ref 0–200)
BASOPHILS NFR BLD AUTO: 0.7 %
BILIRUB SERPL-MCNC: 0.4 MG/DL (ref 0.2–1.2)
BUN SERPL-MCNC: 15 MG/DL (ref 7–25)
BUN/CREAT SERPL: ABNORMAL (CALC) (ref 6–22)
CALCIUM SERPL-MCNC: 9.8 MG/DL (ref 8.6–10.4)
CHLORIDE SERPL-SCNC: 103 MMOL/L (ref 98–110)
CHOLEST SERPL-MCNC: 151 MG/DL
CHOLEST/HDLC SERPL: 2.3 (CALC)
CO2 SERPL-SCNC: 28 MMOL/L (ref 20–32)
CREAT SERPL-MCNC: 0.59 MG/DL (ref 0.5–1.05)
EOSINOPHIL # BLD AUTO: 268 CELLS/UL (ref 15–500)
EOSINOPHIL NFR BLD AUTO: 4.4 %
ERYTHROCYTE [DISTWIDTH] IN BLOOD BY AUTOMATED COUNT: 13.3 % (ref 11–15)
GFR/BSA.PRED SERPLBLD CYS-BASED-ARV: 99 ML/MIN/1.73M2
GLOBULIN SER CALC-MCNC: 2.7 G/DL (CALC) (ref 1.9–3.7)
GLUCOSE SERPL-MCNC: 124 MG/DL (ref 65–99)
HBA1C MFR BLD: 6.9 % OF TOTAL HGB
HCT VFR BLD AUTO: 34 % (ref 35–45)
HDLC SERPL-MCNC: 67 MG/DL
HGB BLD-MCNC: 11.1 G/DL (ref 11.7–15.5)
LDLC SERPL CALC-MCNC: 64 MG/DL (CALC)
LYMPHOCYTES # BLD AUTO: 1537 CELLS/UL (ref 850–3900)
LYMPHOCYTES NFR BLD AUTO: 25.2 %
MCH RBC QN AUTO: 28.8 PG (ref 27–33)
MCHC RBC AUTO-ENTMCNC: 32.6 G/DL (ref 32–36)
MCV RBC AUTO: 88.3 FL (ref 80–100)
MONOCYTES # BLD AUTO: 610 CELLS/UL (ref 200–950)
MONOCYTES NFR BLD AUTO: 10 %
NEUTROPHILS # BLD AUTO: 3642 CELLS/UL (ref 1500–7800)
NEUTROPHILS NFR BLD AUTO: 59.7 %
NONHDLC SERPL-MCNC: 84 MG/DL (CALC)
PLATELET # BLD AUTO: 392 THOUSAND/UL (ref 140–400)
PMV BLD REES-ECKER: 10.1 FL (ref 7.5–12.5)
POTASSIUM SERPL-SCNC: 4.1 MMOL/L (ref 3.5–5.3)
PROT SERPL-MCNC: 7.3 G/DL (ref 6.1–8.1)
RBC # BLD AUTO: 3.85 MILLION/UL (ref 3.8–5.1)
SODIUM SERPL-SCNC: 140 MMOL/L (ref 135–146)
TRIGL SERPL-MCNC: 110 MG/DL
WBC # BLD AUTO: 6.1 THOUSAND/UL (ref 3.8–10.8)

## 2023-11-10 ENCOUNTER — CONSULT (OUTPATIENT)
Age: 68
End: 2023-11-10
Payer: COMMERCIAL

## 2023-11-10 VITALS
DIASTOLIC BLOOD PRESSURE: 66 MMHG | SYSTOLIC BLOOD PRESSURE: 118 MMHG | OXYGEN SATURATION: 96 % | BODY MASS INDEX: 27.99 KG/M2 | HEART RATE: 54 BPM | WEIGHT: 173.4 LBS | TEMPERATURE: 97.8 F

## 2023-11-10 DIAGNOSIS — G47.33 OSA (OBSTRUCTIVE SLEEP APNEA): Primary | ICD-10-CM

## 2023-11-10 PROCEDURE — 99203 OFFICE O/P NEW LOW 30 MIN: CPT | Performed by: INTERNAL MEDICINE

## 2023-11-10 RX ORDER — NIRMATRELVIR AND RITONAVIR 300-100 MG
KIT ORAL EVERY 12 HOURS
COMMUNITY
Start: 2023-10-02 | End: 2023-11-15

## 2023-11-10 RX ORDER — FLASH GLUCOSE SCANNING READER
EACH MISCELLANEOUS
COMMUNITY

## 2023-11-10 RX ORDER — FLUTICASONE PROPIONATE 50 MCG
2 SPRAY, SUSPENSION (ML) NASAL EVERY 24 HOURS
COMMUNITY

## 2023-11-10 RX ORDER — PANTOPRAZOLE SODIUM 40 MG/1
TABLET, DELAYED RELEASE ORAL EVERY 24 HOURS
COMMUNITY
Start: 2023-07-13 | End: 2023-11-15

## 2023-11-10 RX ORDER — OLOPATADINE HYDROCHLORIDE 1 MG/ML
SOLUTION/ DROPS OPHTHALMIC EVERY 12 HOURS
COMMUNITY

## 2023-11-10 RX ORDER — FENOFIBRATE 54 MG/1
TABLET ORAL
COMMUNITY
Start: 2023-09-26

## 2023-11-10 RX ORDER — FERROUS SULFATE 325(65) MG
TABLET ORAL EVERY 24 HOURS
COMMUNITY

## 2023-11-10 NOTE — PROGRESS NOTES
Sleep Medicine Outpatient Note   Josemanuel Abarca 79 y.o. female MRN: [id number]  11/12/2023      Referring Physician: Adam Macdonald MD    Reason for Consultation:    Chief Complaint   Patient presents with    Sleep Apnea     Assessment/Plan:    1. KERRY (obstructive sleep apnea)  Assessment & Plan:  Patient has a PSG in September 2023 that shows AHI 57.9 worse in the supine position and REM. Amount of time below SPO2 90% was only 6.7 minutes. Mostly supine sleep during the night. She has had many years of snoring, dry mouth. She does not have excessive daytime sleepiness. She is very amenable for treatment with CPAP due to concurrent cardiovascular risks of untreated severe obstructive sleep apnea. She has a sister who uses CPAP. I have ordered auto titrating CPAP 4-20 cm H2O with oral interface since she tends to mouth breathe during sleep. Follow-up for compliance visit 1 to 3 months after CPAP initiation. Orders:  -     CPAP Auto [facility]          Health Maintenance  Immunization History   Administered Date(s) Administered    COVID-19 PFIZER VACCINE 0.3 ML IM 02/27/2021, 03/20/2021, 11/08/2021    COVID-19 Pfizer vac (Timothy-sucrose, gray cap) 12 yr+ IM 04/20/2022        Return in about 2 months (around 1/10/2024) for Next scheduled follow up. History of Present Illness   HPI:  Josemanuel Abarca is a 79 y.o. female who has a past medical history of diabetes mellitus, diabetic neuropathy, hypertension, hyperlipidemia, chronic UTI, who is presenting for the evaluation of severe obstructive sleep apnea    PSG ordered by her PCP performed 9/18/2023 showed severe obstructive sleep apnea with AHI of 57.9, worse in the supine position and REM. Amount of time with SPO2 below 90% was 6.7 minutes. Patient slept 83.5% of the night in the supine position. No significant periodic limb movements. Sleep latency was 52.6%.   Sleep efficiency was 68 minutes    She has significant oxygen desaturations during REM sleep. She only had 1 period of REM at the end of the night. She has a long history of snoring for 25-30 minutes. She tends to breathe through her mouth. A lot of dry mouth. She has a sister with KERRY and uses CPAP with nasal pillows    Had COVID 19 in 10/2023 - s/p Paxlovid. She has no dramatic sx of sleep apnea such as witnessed apneas, gasping/snorting during sleep. She has 1-2 episodes of nocturia. She goes to bed around 11pm-midnight, 8am.  No naps. She gets frequent UTIs. She doesn't use nitrofurantoin often. She takes brief courses every few months. She is trying to see a new urologist.      She is a former teacher.        Historical Information   Past Medical History:   Diagnosis Date    Castelan's esophagus 2023    Bicornate uterus     GERD (gastroesophageal reflux disease)     Hyperlipidemia     Hypertension     Macular degeneration of left eye      Past Surgical History:   Procedure Laterality Date     SECTION      X 2    CHOLECYSTECTOMY      lap    TUBAL LIGATION Bilateral      Family History   Problem Relation Age of Onset    Myocarditis Mother     Melanoma Father     Diabetes type II Father     Stroke Father     Diabetes type II Sister     Hypertension Sister     No Known Problems Daughter     No Known Problems Daughter     Breast cancer Maternal Grandmother          at an early age    No Known Problems Maternal Grandfather     No Known Problems Paternal Grandmother     No Known Problems Paternal Grandfather     Hypertension Brother     Diabetes type II Brother     Breast cancer Maternal Aunt         unknown age of onset    No Known Problems Maternal Aunt     No Known Problems Paternal Aunt     Diabetes type II Paternal Uncle          Meds/Allergies     Current Outpatient Medications:     Ascorbic Acid (Vitamin C) 500 MG CAPS, Take by mouth in the morning, Disp: , Rfl:     atorvastatin (LIPITOR) 40 mg tablet, Take 40 mg by mouth in the morning  , Disp: , Rfl:     Continuous Blood Gluc  (FreeStyle Sweta 14 Day [place]) ALYSHA, As directed, Disp: , Rfl:     doxycycline (VIBRAMYCIN), Take 2 immediately at discovery of attached tick Oral 2 at once-single dose for non pregnant, non lactating adults, Disp: , Rfl:     doxycycline hyclate (VIBRAMYCIN) 100 mg capsule, prn, Disp: , Rfl:     fenofibrate (TRICOR) 54 MG tablet, take 1 tablet by mouth once daily with meals, Disp: , Rfl:     ferrous sulfate 325 (65 Fe) mg tablet, every 24 hours, Disp: , Rfl:     fluticasone (Flonase Allergy Relief) 50 mcg/act nasal spray, 2 sprays every 24 hours, Disp: , Rfl:     Janumet -1000 MG TB24, Take 1 tablet by mouth every morning, Disp: 30 tablet, Rfl: 3    Magnesium 125 MG CAPS, as directed Orally, Disp: , Rfl:     Magnesium Cl-Calcium Carbonate (SLOW-MAG PO), Take by mouth in the morning, Disp: , Rfl:     metFORMIN (GLUCOPHAGE-XR) 500 mg 24 hr tablet, Take 2 tablets (1,000 mg total) by mouth daily with dinner, Disp: , Rfl:     MISC NATURAL PRODUCTS EX, Apply topically Magnesium cream to legs at night for leg cramps, Disp: , Rfl:     Multiple Vitamins-Minerals (MULTIVITAMIN ADULTS 50+ PO), Orally, Disp: , Rfl:     Multiple Vitamins-Minerals (Multivitamin Gummies Womens) CHEW, Chew 1 each daily, Disp: , Rfl:     Multiple Vitamins-Minerals (PRESERVISION AREDS PO), Take by mouth in the morning, Disp: , Rfl:     nitrofurantoin (MACRODANTIN) 50 mg capsule, prn, Disp: , Rfl:     olopatadine (Pataday) 0.1 % ophthalmic solution, Every 12 hours, Disp: , Rfl:     omeprazole (PriLOSEC) 20 mg delayed release capsule, Take 40 mg by mouth daily, Disp: , Rfl:     pioglitazone (ACTOS) 45 mg tablet, Take 45 mg by mouth daily  , Disp: , Rfl:     FENOFIBRATE MICRONIZED PO, Take 54 mg by mouth in the morning   (Patient not taking: Reported on 11/10/2023), Disp: , Rfl:     ferrous gluconate (FERGON) 324 mg tablet, Take 324 mg by mouth daily with breakfast, Disp: , Rfl:     pantoprazole (PROTONIX) 40 mg tablet, every 24 hours (Patient not taking: Reported on 11/10/2023), Disp: , Rfl:     Paxlovid, 300/100, tablet therapy pack, Every 12 hours, Disp: , Rfl:     Potassium 99 MG TABS, Take by mouth (Patient not taking: Reported on 11/10/2023), Disp: , Rfl:     valsartan-hydrochlorothiazide (DIOVAN-HCT) 320-25 MG per tablet, Take 1 tablet by mouth daily (Patient not taking: Reported on 11/10/2023), Disp: , Rfl:     vitamin B-12 (VITAMIN B-12) 1,000 mcg tablet, Take 2 tablets (2,000 mcg total) by mouth daily (Patient not taking: Reported on 11/10/2023), Disp: , Rfl:     zinc gluconate 50 mg tablet, Take 50 mg by mouth daily (Patient not taking: Reported on 11/10/2023), Disp: , Rfl:   Allergies   Allergen Reactions    Jardiance [Empagliflozin] Other (See Comments)     Frequent yeast infections    Iodinated Contrast Media Rash       Vitals: Blood pressure 118/66, pulse (!) 54, temperature 97.8 °F (36.6 °C), weight 78.7 kg (173 lb 6.4 oz), SpO2 96 %. Body mass index is 27.99 kg/m². Oxygen Therapy  SpO2: 96 %      Physical Exam  Vitals and nursing note reviewed. Constitutional:       General: She is not in acute distress. Appearance: She is well-developed. She is not ill-appearing, toxic-appearing or diaphoretic. HENT:      Head: Normocephalic and atraumatic. Mouth/Throat:      Mouth: Mucous membranes are moist.      Pharynx: Oropharynx is clear. No oropharyngeal exudate. Comments: Mallampati 4, mild overbite  Eyes:      General: No scleral icterus. Extraocular Movements: Extraocular movements intact. Conjunctiva/sclera: Conjunctivae normal.   Cardiovascular:      Rate and Rhythm: Normal rate and regular rhythm. Pulmonary:      Effort: Pulmonary effort is normal. No respiratory distress. Breath sounds: Normal breath sounds. No stridor. Abdominal:      Tenderness: There is no guarding. Musculoskeletal:         General: No swelling.       Cervical back: Normal range of motion and neck supple. No rigidity. Right lower leg: No edema. Left lower leg: No edema. Skin:     General: Skin is warm and dry. Coloration: Skin is not jaundiced. Neurological:      General: No focal deficit present. Mental Status: She is alert and oriented to person, place, and time. Mental status is at baseline. Psychiatric:         Mood and Affect: Mood normal.             Labs: I have personally reviewed pertinent lab results. ABG: No results found for: "PHART", "JYL9VRI", "PO2ART", "DDU2KBN", "[id number]", "BEART", "SOURCE",   BNP: No results found for: "BNP",   CBC:  Lab Results   Component Value Date    WBC 6.1 11/08/2023    HGB 11.1 (L) 11/08/2023    HCT 34.0 (L) 11/08/2023    MCV 88.3 11/08/2023     11/08/2023    EOSPCT 4.4 11/08/2023    EOSABS 268 11/08/2023    NEUTOPHILPCT 59.7 11/08/2023    LYMPHOPCT 25.2 11/08/2023   ,   CMP:   Lab Results   Component Value Date    SODIUM 140 11/08/2023    K 4.1 11/08/2023     11/08/2023    CO2 28 11/08/2023    BUN 15 11/08/2023    CREATININE 0.59 11/08/2023    CALCIUM 9.8 11/08/2023    AST 12 11/08/2023    ALT 15 11/08/2023    ALKPHOS 33 (L) 11/08/2023    EGFR 99 11/08/2023   ,   PT/INR: No results found for: "PT", "INR",   Ferrtin: No components found for: "FERRTIN",  Magensium: No results found for: "MAGNESIUM",      Imaging and other studies: I have personally reviewed pertinent reports. and I have personally reviewed pertinent films in PACS      Sleep Study:  9/18/2023  SLEEP:  Patient slept for 218.5 minutes out of 415.7 minutes in bed representing a sleep efficiency of 52.6%. Sleep architecture showed a sleep latency of 68.5 minutes and a REM sleep latency of 188.0 minutes. There was 27.9% Stage N1 noted. There was 58.8% Stage N2 noted. There was 0.0% Stage N3 noted. There was 13.3% REM sleep noted. The patient had 179 arousals for an average of 49.2 arousals per hour of sleep.     RESPIRATORY:  There were a total of 211 respiratory events made up of 40 obstructive apneas, 0 mixed apneas, 3 central apneas, and 168 hypopneas resulting in an apnea/hypopnea index (AHI) of 57.9 events per hour of sleep. The AHI in the supine position was 73.0. The AHI during REM sleep was 89.0. Loud intensity snoring was noted. There were also 30 respiratory effort related arousals resulting in an RDI of 66.2/h. OXIMETRY:  The baseline oxygen saturation with the patient awake was 95.6%. The mean oxygen saturation throughout the study was 94.2%. The amount of sleep time below 90% was 6.7 minutes. The lowest oxygen saturation was 77.0%. BODY POSITION:  The patient slept 83.5% of the evening in the supine position, 16.5% on left side, 0.0% on right side, and 0.0%  in the prone position. ADDITIONAL DATA:   EMG, EEG & ECG were unremarkable    IMPRESSION:   Severe obstructive sleep apnea  Sleep onset and sleep maintenance insomnia with severe sleep fragmentation        Ines Oconnor MD  Pulmonary, Critical Care and Sleep Medicine  [facility]     Portions of the record may have been created with voice recognition software. Occasional wrong word or "sound a like" substitutions may have occurred due to the inherent limitations of voice recognition software. Please read the chart carefully and recognize, using context, where substitutions have occurred.

## 2023-11-12 NOTE — ASSESSMENT & PLAN NOTE
Patient has a PSG in September 2023 that shows AHI 57.9 worse in the supine position and REM. Amount of time below SPO2 90% was only 6.7 minutes. Mostly supine sleep during the night. She has had many years of snoring, dry mouth. She does not have excessive daytime sleepiness. She is very amenable for treatment with CPAP due to concurrent cardiovascular risks of untreated severe obstructive sleep apnea. She has a sister who uses CPAP. I have ordered auto titrating CPAP 4-20 cm H2O with oral interface since she tends to mouth breathe during sleep. Follow-up for compliance visit 1 to 3 months after CPAP initiation.

## 2023-11-15 ENCOUNTER — OFFICE VISIT (OUTPATIENT)
Dept: ENDOCRINOLOGY | Facility: HOSPITAL | Age: 68
End: 2023-11-15
Payer: COMMERCIAL

## 2023-11-15 VITALS
WEIGHT: 172 LBS | DIASTOLIC BLOOD PRESSURE: 80 MMHG | BODY MASS INDEX: 27.64 KG/M2 | HEIGHT: 66 IN | SYSTOLIC BLOOD PRESSURE: 124 MMHG | HEART RATE: 88 BPM

## 2023-11-15 DIAGNOSIS — I10 PRIMARY HYPERTENSION: ICD-10-CM

## 2023-11-15 DIAGNOSIS — E78.2 MIXED HYPERLIPIDEMIA: ICD-10-CM

## 2023-11-15 DIAGNOSIS — E11.42 DIABETIC POLYNEUROPATHY ASSOCIATED WITH TYPE 2 DIABETES MELLITUS (HCC): ICD-10-CM

## 2023-11-15 DIAGNOSIS — E11.65 TYPE 2 DIABETES MELLITUS WITH HYPERGLYCEMIA, WITHOUT LONG-TERM CURRENT USE OF INSULIN (HCC): Primary | ICD-10-CM

## 2023-11-15 LAB

## 2023-11-15 PROCEDURE — 99214 OFFICE O/P EST MOD 30 MIN: CPT | Performed by: INTERNAL MEDICINE

## 2023-11-15 NOTE — PATIENT INSTRUCTIONS
Hgba1c is 6.9%. this is excellent. Continue the same jaunmet, metformin, and pioglitazone. Work on testing blood sugars up to 1-2 times daily or use the freestyle osman. The rest of the blood work is good except for the hemoglobin remains low. Follow up in 3 months with blood work.

## 2023-11-15 NOTE — PROGRESS NOTES
Diagnoses and all orders for this visit:    Type 2 diabetes mellitus with hyperglycemia, without long-term current use of insulin ([address])  -     Comprehensive metabolic panel Lab Collect; Future  -     HEMOGLOBIN A1C W/ EAG ESTIMATION Lab Collect; Future    Diabetic polyneuropathy associated with type 2 diabetes mellitus ([address])  -     Comprehensive metabolic panel Lab Collect; Future  -     HEMOGLOBIN A1C W/ EAG ESTIMATION Lab Collect; Future    Primary hypertension  -     Comprehensive metabolic panel Lab Collect; Future  -     HEMOGLOBIN A1C W/ EAG ESTIMATION Lab Collect; Future    Mixed hyperlipidemia  -     Comprehensive metabolic panel Lab Collect; Future  -     HEMOGLOBIN A1C W/ EAG ESTIMATION Lab Collect; Future        11/15/2023    Assessment/Plan        1. Type 2 diabetes. Most recent hemoglobin A1c of 6.9% does demonstrate excellent control of her diabetes. For now, she will continue the same Janumet XR, metformin XR, and pioglitazone. She will continue to work on dietary changes. She will continue to test her blood sugars with either the FreeStyle Sweta or fingerstick once or twice a day. 2. Diabetic neuropathy. She has no neuropathic symptoms. She no longer sees podiatry as her plantar fasciitis is resolved. Last diabetic foot exam was 03/2023 in the endocrine office visit, so she is up to date with foot exams at this time. 3. Hypertension. She is normotensive in the office on her current dose of valsartan/HCTZ. 4. Hyperlipidemia. Most recent lipid profile is excellent. She will continue the same fenofibrate and atorvastatin. I have asked her to follow up in 3 months with preceding hemoglobin A1c and CMP. CC: Diabetes type II, blood pressure, lipid follow-up    History of Present Illness     HPI: Fernando Stark is a 79y.o. year old female with type 2 diabetes with neuropathy for 19 years, hypertension, hyperlipidemia for follow-up visit.  Diabetic complications include neuropathy. She has no nephropathy, retinopathy, heart attack, stroke, or claudication. The patient admits that she is on Janumet /1000 mg once a day, metformin  mg 2 at dinner, and pioglitazone 45 mg daily for her diabetes. She reports that she has polyuria because she has been drinking a lot of tea at night and she wakes up twice at night to urinate. She mentions that she started going to yoga until she got plantar fasciitis. She was trying to deal with her reflux and the Castelan's, so she is seeing a nutritionist trying different things "holistic [facility] medicine" to adjust her diet. She notes that her hemoglobin and iron absorption is still decreasing. She states that she is not yet ready for any iron transfusions or blood transfusions. She admits that she has been on a carbohydrate restricted diet. She was working with her inflammation and trying different things including probiotic stuff. Her nutritionist advised her to start with miso instead of tea or coffee every morning. The patient mentions that she was evaluating her blood glucose infrequently for about an hour and a half right after her breakfast resulting to 171 mg/dL and she is taking her Janumet an hour after she brushed her teeth. She denies that she noticed hypoglycemic episodes. She has macular degeneration but denies having blurry vision. She notes that she occasionally wears her contact lenses. She denies having paresthesia and mentions that her plantar fasciitis was relieved. The patient was also taking valsartan/HCTZ 320/25 mg daily for her blood pressure issue, atorvastatin 40 mg and fenofibrate 54 mg for hyperlipidemia, 2 irons, and magnesium pill. She admits that she has headaches, lightheadedness, and dizziness. She notes that she was re-infected with COVID-19 6 weeks ago. She denies she has stroke, chest pain, or dyspnea. She reports that she is also taking multivitamin and raw foods.      She mentions that she has severe sleep apnea and she had been to sleep study. The patient states that she has an appointment with her ophthalmologist after Thanksgiving. The patient's last foot exam was on 2023. Last A1C was   Lab Results   Component Value Date    HGBA1C 6.9 (H) 2023   . Review of Systems  The pertinent positive and negative findings are as noted in the HPI.     Historical Information   Past Medical History:   Diagnosis Date    Castelan's esophagus 2023    Bicornate uterus     GERD (gastroesophageal reflux disease)     Hyperlipidemia     Hypertension     Macular degeneration of left eye     Sleep apnea      Past Surgical History:   Procedure Laterality Date     SECTION      X 2    CHOLECYSTECTOMY      lap    TUBAL LIGATION Bilateral      Social History   Social History     Substance and Sexual Activity   Alcohol Use Yes    Alcohol/week: 2.0 standard drinks of alcohol    Types: 1 Glasses of wine, 1 Cans of beer per week    Comment: Reduced consumption     Social History     Substance and Sexual Activity   Drug Use Never     Social History     Tobacco Use   Smoking Status Former    Packs/day: 0.25    Years: 10.00    Total pack years: 2.50    Types: Cigarettes    Start date: 1975    Quit date: 1985    Years since quittin.8   Smokeless Tobacco Never   Tobacco Comments    Social habit, late teens through late 25s     Family History:   Family History   Problem Relation Age of Onset    Myocarditis Mother     Melanoma Father     Diabetes type II Father             Stroke Father     Diabetes type II Sister         Recently diagnosed    Hypertension Sister         Managed with meds    No Known Problems Daughter     No Known Problems Daughter     Breast cancer Maternal Grandmother          at an early age    No Known Problems Maternal Grandfather     No Known Problems Paternal Grandmother     No Known Problems Paternal Grandfather     Hypertension Brother Managed with meds    Diabetes type II Brother         Recently Diagnosed    Breast cancer Maternal Aunt         unknown age of onset    No Known Problems Maternal Aunt     No Known Problems Paternal Aunt     Diabetes type II Paternal Uncle                Meds/Allergies   Current Outpatient Medications   Medication Sig Dispense Refill    atorvastatin (LIPITOR) 40 mg tablet Take 40 mg by mouth in the morning        Continuous Blood Gluc  (FreeStyle Sweta 14 Day [place]) ALYSHA As directed      doxycycline (VIBRAMYCIN) Take 2 immediately at discovery of attached tick Oral 2 at once-single dose for non pregnant, non lactating adults      doxycycline hyclate (VIBRAMYCIN) 100 mg capsule prn      fenofibrate (TRICOR) 54 MG tablet take 1 tablet by mouth once daily with meals      ferrous sulfate 325 (65 Fe) mg tablet every 24 hours      fluticasone (Flonase Allergy Relief) 50 mcg/act nasal spray 2 sprays every 24 hours      Janumet -1000 MG TB24 Take 1 tablet by mouth every morning 30 tablet 3    Magnesium Cl-Calcium Carbonate (SLOW-MAG PO) Take by mouth in the morning      metFORMIN (GLUCOPHAGE-XR) 500 mg 24 hr tablet Take 2 tablets (1,000 mg total) by mouth daily with dinner      MISC NATURAL PRODUCTS EX Apply topically Magnesium cream to legs at night for leg cramps      Multiple Vitamins-Minerals (MULTIVITAMIN ADULTS 50+ PO) Orally      Multiple Vitamins-Minerals (PRESERVISION AREDS PO) Take by mouth in the morning      nitrofurantoin (MACRODANTIN) 50 mg capsule prn      olopatadine (Pataday) 0.1 % ophthalmic solution Every 12 hours      omeprazole (PriLOSEC) 20 mg delayed release capsule Take 40 mg by mouth daily      pioglitazone (ACTOS) 45 mg tablet Take 45 mg by mouth daily        valsartan-hydrochlorothiazide (DIOVAN-HCT) 320-25 MG per tablet Take 1 tablet by mouth daily       No current facility-administered medications for this visit.      Allergies   Allergen Reactions    Jardiance [Empagliflozin] Other (See Comments)     Frequent yeast infections    Iodinated Contrast Media Rash       Objective   Vitals: Blood pressure 124/80, pulse 88, height 5' 6" (1.676 m), weight 78 kg (172 lb). Invasive Devices       None                   Physical Exam  Physical exams are notable for is normal with pertinent positives and negatives. EETN: Thyroid normal in size. Respiratory: Lungs clear. Cardiovascular: Heart regular. No murmurs. Musculoskeletal: No lower extremity edema. The history was obtained from the review of the chart and from the patient    Lab Results:    Most recent Alc is  Lab Results   Component Value Date    HGBA1C 6.9 (H) 11/08/2023               Lab Results   Component Value Date    CREATININE 0.59 11/08/2023    CREATININE 0.66 07/11/2023    CREATININE 0.60 03/03/2023    BUN 15 11/08/2023    K 4.1 11/08/2023     11/08/2023    CO2 28 11/08/2023     eGFR   Date Value Ref Range Status   11/08/2023 99 > OR = 60 mL/min/1.73m2 Final         Lab Results   Component Value Date    HDL 67 11/08/2023    TRIG 110 11/08/2023       Lab Results   Component Value Date    ALT 15 11/08/2023    AST 12 11/08/2023    ALKPHOS 33 (L) 11/08/2023       Lab Results   Component Value Date    TSH 3.18 07/11/2023       Blood work performed on 11/08/2023 showed a hemoglobin A1c of 6.9%. CMP showed glucose of 124 fasting but was otherwise normal.     Total cholesterol 151, triglyceride 110, HDL 67, LDL 64. CBC continues to show mild anemia with a hemoglobin of 11.1 and a hematocrit of 34.0. Future Appointments   Date Time Provider [address] [place] [address]   12/12/2023  2:30 PM UB MAMMO UP 1 UB UP Mammo UB UPPER PER   12/12/2023  3:00 PM UB DEXA UP 1 UB UP Dexa UB UPPER PER   3/12/2024 11:40 AM Lennox Roughen, MD ENDO [facility]       Transcribed for Lennox Roughen, MD, by Suzan Alvarado on 11/15/23 at 9:14 PM. Powered by Dragon [employer].

## 2023-11-22 LAB
DME PARACHUTE DELIVERY DATE ACTUAL: NORMAL
DME PARACHUTE DELIVERY DATE EXPECTED: NORMAL
DME PARACHUTE DELIVERY DATE REQUESTED: NORMAL
DME PARACHUTE ITEM DESCRIPTION: NORMAL
DME PARACHUTE ORDER STATUS: NORMAL
DME PARACHUTE SUPPLIER NAME: NORMAL
DME PARACHUTE SUPPLIER PHONE: NORMAL

## 2023-12-12 ENCOUNTER — HOSPITAL ENCOUNTER (OUTPATIENT)
Dept: MAMMOGRAPHY | Facility: CLINIC | Age: 68
Discharge: HOME | End: 2023-12-12
Payer: COMMERCIAL

## 2023-12-12 ENCOUNTER — HOSPITAL ENCOUNTER (OUTPATIENT)
Dept: BONE DENSITY | Facility: CLINIC | Age: 68
Discharge: HOME | End: 2023-12-12
Payer: COMMERCIAL

## 2023-12-12 VITALS — WEIGHT: 172 LBS | HEIGHT: 66 IN | BODY MASS INDEX: 27.64 KG/M2

## 2023-12-12 VITALS — BODY MASS INDEX: 28.66 KG/M2 | WEIGHT: 172 LBS | HEIGHT: 65 IN

## 2023-12-12 DIAGNOSIS — Z13.820 SPECIAL SCREENING FOR OSTEOPOROSIS: ICD-10-CM

## 2023-12-12 DIAGNOSIS — Z12.31 ENCOUNTER FOR SCREENING MAMMOGRAM FOR MALIGNANT NEOPLASM OF BREAST: ICD-10-CM

## 2023-12-12 PROCEDURE — 77080 DXA BONE DENSITY AXIAL: CPT

## 2023-12-12 PROCEDURE — 77067 SCR MAMMO BI INCL CAD: CPT

## 2023-12-12 PROCEDURE — 77063 BREAST TOMOSYNTHESIS BI: CPT

## 2024-01-04 ENCOUNTER — TELEPHONE (OUTPATIENT)
Age: 69
End: 2024-01-04

## 2024-01-04 NOTE — TELEPHONE ENCOUNTER
Patient l/m to schedule an appt w/ Dr. Muse for a year follow up and would like to discuss her CPAP.    Returned patients call - no answer - left voicemail to call office to schedule.    Pt# [phone]

## 2024-01-08 NOTE — PROGRESS NOTES
Sleep Medicine Outpatient Follow Up Note   Sarahi Gutierrez 68 y.o. female MRN: [id number]  1/9/2024      Reason for Consultation:    Chief Complaint   Patient presents with    Sleep Apnea     Assessment/Plan:    1. KERRY (obstructive sleep apnea)  Assessment & Plan:  PSG in September 2023 that shows AHI 57.9 worse in the supine position and REM.  Amount of time below SPO2 90% was only 6.7 minutes.  Mostly supine sleep during the night.     She has had many years of snoring, dry mouth.  She does not have excessive daytime sleepiness.     She was very amenable for treatment with CPAP due to concurrent cardiovascular risks of untreated severe obstructive sleep apnea.  She has a sister who uses CPAP.     Started auto CPAP 4-20 cm H2O.  Could not tolerate full face mask.  Now on nasal cradles with mild improvement in tolerance.  She is getting used to CPAP more now after 3 months of use.    Compliance data:  1/7/2024  DME [facility]  93% use more than 4 hours  Average use 5 hours and 12 minutes  AirSense 10 4-20 cm H2O; EPR 2  Median pressure 9.5, 95th percentile 12.3  Median leak 4.9, 95th percentile 28.7  Residual AHI 1.8    She will try to increase use and become more accustomed to CPAP.  Decrease pressure to 4-12 cm H2O  Supply prescription provided  Follow-up in 6 months    Orders:  -     PAP DME Pressure Change  -     PAP DME Resupply/Reorder        Health Maintenance  Immunization History   Administered Date(s) Administered    COVID-19 PFIZER VACCINE 0.3 ML IM 02/27/2021, 03/20/2021, 11/08/2021    COVID-19 Pfizer Vac BIVALENT Timothy-sucrose 12 Yr+ IM 10/16/2022    COVID-19 Pfizer vac (Timothy-sucrose, gray cap) 12 yr+ IM 04/20/2022    H1N1, All Formulations 01/14/2010    INFLUENZA 11/27/2015, 09/11/2017, 10/30/2018, 09/17/2020, 10/04/2021, 10/16/2022    Influenza Split High Dose Preservative Free IM 10/18/2023    Influenza, injectable, quadrivalent, preservative free 0.5 mL 09/17/2020    Pneumococcal Conjugate  13-Valent 01/05/2021    Zoster 07/31/2016    Zoster Vaccine Recombinant 04/30/2019        Return in about 6 months (around 7/9/2024).    History of Present Illness   HPI:  Sarahi Gutierrez is a 68 y.o. female who has a past medical history of diabetes mellitus, diabetic neuropathy, hypertension, hyperlipidemia, chronic UTI, who is presenting for the follow up of severe obstructive sleep apnea     PSG in September 2023 that shows AHI 57.9 worse in the supine position and REM.  Amount of time below SPO2 90% was only 6.7 minutes.  Mostly supine sleep during the night.     She has been on CPAP for 3 months.  She started with a full face mask and switched to a nasal pillow right before Holidays.  Too much air leak with full face mask.  At one point she woke up feeling like she was suffocating.      DME is [facility].  She set up a mask fitting appointment and now using nasal cushions with occasional discomfort.  Last night she was on it for 8 hours.      Her  notes that snoring is decreased with CPAP.      She still has mask leaks with nasal pillows and the pillows do irritate her nose at times.  Frustration is decreasing with nasal masks.      Last caffeine drink is at 3pm now.      She takes flonase/antihistamine.  Still taking a benadryl every night.      She is getting into sleep a little bit faster now.  She is seeing a gradual increase in usage.      She has a [employer] CGM in her right arm.      Using PAP every night/day: yes  Using PAP the entire duration of sleep: improving  Benefiting from PAP: mild occasional benefit    [place]: 2    Historical Information   Past Medical History:   Diagnosis Date    Castelan's esophagus 07/2023    Bicornate uterus     Diabetes mellitus (HCC) 2010/2011    Diagnosed by family provider    GERD (gastroesophageal reflux disease)     Hyperlipidemia     Hypertension     Macular degeneration of left eye     Sleep apnea     Sleep apnea, obstructive 9/2013    Probably had it  earlier in my life     Past Surgical History:   Procedure Laterality Date     SECTION      X 2    CHOLECYSTECTOMY      lap    TUBAL LIGATION Bilateral      Family History   Problem Relation Age of Onset    Myocarditis Mother     Melanoma Father     Diabetes type II Father             Stroke Father     Diabetes Father          at 85 yrs. - managed disease w/ metformin    Diabetes type II Sister         Recently diagnosed    Hypertension Sister         Managed with meds    No Known Problems Daughter     No Known Problems Daughter     Breast cancer Maternal Grandmother          at an early age    No Known Problems Maternal Grandfather     No Known Problems Paternal Grandmother     No Known Problems Paternal Grandfather     Hypertension Brother         Managed with meds    Diabetes type II Brother         Recently Diagnosed    Breast cancer Maternal Aunt         unknown age of onset    No Known Problems Maternal Aunt     No Known Problems Paternal Aunt     Diabetes type II Paternal Uncle                  Meds/Allergies     Current Outpatient Medications:     atorvastatin (LIPITOR) 40 mg tablet, Take 40 mg by mouth in the morning  , Disp: , Rfl:     Continuous Blood Gluc  (FreeStyle Sweta 14 Day [place]) ALYSHA, As directed, Disp: , Rfl:     doxycycline (VIBRAMYCIN), Take 2 immediately at discovery of attached tick Oral 2 at once-single dose for non pregnant, non lactating adults, Disp: , Rfl:     doxycycline hyclate (VIBRAMYCIN) 100 mg capsule, prn, Disp: , Rfl:     fenofibrate (TRICOR) 54 MG tablet, take 1 tablet by mouth once daily with meals, Disp: , Rfl:     ferrous sulfate 325 (65 Fe) mg tablet, every 24 hours, Disp: , Rfl:     fluticasone (Flonase Allergy Relief) 50 mcg/act nasal spray, 2 sprays every 24 hours, Disp: , Rfl:     Janumet -1000 MG TB24, Take 1 tablet by mouth every morning, Disp: 30 tablet, Rfl: 3    Magnesium Cl-Calcium Carbonate (SLOW-MAG PO), Take by  "mouth in the morning, Disp: , Rfl:     metFORMIN (GLUCOPHAGE-XR) 500 mg 24 hr tablet, Take 2 tablets (1,000 mg total) by mouth daily with dinner, Disp: , Rfl:     MISC NATURAL PRODUCTS EX, Apply topically Magnesium cream to legs at night for leg cramps, Disp: , Rfl:     Multiple Vitamins-Minerals (MULTIVITAMIN ADULTS 50+ PO), Orally, Disp: , Rfl:     Multiple Vitamins-Minerals (PRESERVISION AREDS PO), Take by mouth in the morning, Disp: , Rfl:     nitrofurantoin (MACRODANTIN) 50 mg capsule, prn, Disp: , Rfl:     omeprazole (PriLOSEC) 20 mg delayed release capsule, Take 40 mg by mouth daily, Disp: , Rfl:     pioglitazone (ACTOS) 45 mg tablet, Take 45 mg by mouth daily  , Disp: , Rfl:     valsartan-hydrochlorothiazide (DIOVAN-HCT) 320-25 MG per tablet, Take 1 tablet by mouth daily, Disp: , Rfl:     olopatadine (Pataday) 0.1 % ophthalmic solution, Every 12 hours, Disp: , Rfl:   Allergies   Allergen Reactions    Jardiance [Empagliflozin] Other (See Comments)     Frequent yeast infections    Iodinated Contrast Media Rash       Vitals: Blood pressure 120/82, pulse 76, temperature 97.5 °F (36.4 °C), temperature source Tympanic, resp. rate 16, height 5' 5\" (1.651 m), weight 78.9 kg (174 lb), SpO2 95%. Body mass index is 28.96 kg/m². Oxygen Therapy  SpO2: 95 %  Oxygen Therapy: None (Room air)    Physical Exam  Vitals and nursing note reviewed.   Constitutional:       General: She is not in acute distress.     Appearance: She is well-developed. She is not ill-appearing, toxic-appearing or diaphoretic.   HENT:      Head: Normocephalic and atraumatic.   Eyes:      General: No scleral icterus.     Extraocular Movements: Extraocular movements intact.      Conjunctiva/sclera: Conjunctivae normal.   Cardiovascular:      Rate and Rhythm: Normal rate and regular rhythm.   Pulmonary:      Effort: Pulmonary effort is normal. No respiratory distress.      Breath sounds: Normal breath sounds. No stridor.   Abdominal:      Tenderness: " "There is no guarding.   Musculoskeletal:         General: No swelling.      Cervical back: Normal range of motion.      Right lower leg: No edema.      Left lower leg: No edema.   Skin:     General: Skin is warm and dry.      Coloration: Skin is not jaundiced.   Neurological:      General: No focal deficit present.      Mental Status: She is alert and oriented to person, place, and time. Mental status is at baseline.   Psychiatric:         Mood and Affect: Mood normal.             Labs:   I have personally reviewed pertinent lab results.    ABG: No results found for: \"PHART\", \"HQN1SZL\", \"PO2ART\", \"VUJ5ZWM\", \"Y3MTZFYG\", \"BEART\", \"SOURCE\",   BNP: No results found for: \"BNP\",   CBC:  Lab Results   Component Value Date    WBC 6.1 11/08/2023    HGB 11.1 (L) 11/08/2023    HCT 34.0 (L) 11/08/2023    MCV 88.3 11/08/2023     11/08/2023    EOSPCT 4.4 11/08/2023    EOSABS 268 11/08/2023    NEUTOPHILPCT 59.7 11/08/2023    LYMPHOPCT 25.2 11/08/2023   ,   CMP:   Lab Results   Component Value Date    SODIUM 140 11/08/2023    K 4.1 11/08/2023     11/08/2023    CO2 28 11/08/2023    BUN 15 11/08/2023    CREATININE 0.59 11/08/2023    CALCIUM 9.8 11/08/2023    AST 12 11/08/2023    ALT 15 11/08/2023    ALKPHOS 33 (L) 11/08/2023    EGFR 99 11/08/2023   ,   PT/INR: No results found for: \"PT\", \"INR\",   Ferrtin: No components found for: \"FERRTIN\",  Magensium: No results found for: \"MAGNESIUM\",      Imaging and other studies: I have personally reviewed pertinent reports.   and I have personally reviewed pertinent films in PACS      Sleep Study:  9/18/2023  SLEEP:  Patient slept for 218.5 minutes out of 415.7 minutes in bed representing a sleep efficiency of 52.6%.  Sleep architecture showed a sleep latency of 68.5 minutes and a REM sleep latency of 188.0 minutes.  There was 27.9% Stage N1 noted.  There was 58.8% Stage N2 noted.  There was 0.0% Stage N3 noted. There was 13.3% REM sleep noted. The patient had 179 arousals for an " "average of 49.2 arousals per hour of sleep.     RESPIRATORY:  There were a total of 211 respiratory events made up of 40 obstructive apneas, 0 mixed apneas, 3 central apneas, and 168 hypopneas resulting in an apnea/hypopnea index (AHI) of 57.9 events per hour of sleep.  The AHI in the supine position was 73.0.  The AHI during REM sleep was 89.0.  Loud intensity snoring was noted.  There were also 30 respiratory effort related arousals resulting in an RDI of 66.2/h.     OXIMETRY:  The baseline oxygen saturation with the patient awake was 95.6%.  The mean oxygen saturation throughout the study was 94.2%.  The amount of sleep time below 90% was 6.7 minutes.  The lowest oxygen saturation was 77.0%.     BODY POSITION:  The patient slept 83.5% of the evening in the supine position, 16.5% on left side, 0.0% on right side, and 0.0%  in the prone position.      ADDITIONAL DATA:   EMG, EEG & ECG were unremarkable      Compliance data:  1/7/2024  DME [facility]  93% use more than 4 hours  Average use 5 hours and 12 minutes  AirSense 10 4-20 cm H2O; EPR 2  Median pressure 9.5, 95th percentile 12.3  Median leak 4.9, 95th percentile 28.7  Residual AHI 1.8    Transthoracic Echo:    Pulmonary Function Testing:      Sam Muse MD  Pulmonary, Critical Care and Sleep Medicine  [facility]     Portions of the record may have been created with voice recognition software. Occasional wrong word or \"sound a like\" substitutions may have occurred due to the inherent limitations of voice recognition software. Please read the chart carefully and recognize, using context, where substitutions have occurred.       Answers submitted by the patient for this visit:  Pulmonology Questionnaire (Submitted on 1/8/2024)  Chief Complaint: Primary symptoms  Have you had a change in appetite?: No  Do you have chest pain?: No  Do you have shortness of breath that occurs with effort or exertion?: No  Do you have ear " congestion?: No  Do you have ear pain?: No  Do you have a fever?: No  Do you have headaches?: No  Do you have heartburn?: No  Do you have fatigue?: No  Do you have muscle pain?: No  Do you have nasal congestion?: Yes  Do you have shortness of breath when lying flat?: No  Do you have shortness of breath when you wake up?: No  Do you have post-nasal drip?: No  Do you have a runny nose?: Yes  Do you have sneezing?: Yes  Do you have a sore throat?: No  Do you have sweats?: No  Do you have trouble swallowing?: No  Have you experienced weight loss?: No  Which of the following makes your symptoms worse?: change in weather, pollen

## 2024-01-09 ENCOUNTER — OFFICE VISIT (OUTPATIENT)
Age: 69
End: 2024-01-09
Payer: COMMERCIAL

## 2024-01-09 VITALS
HEIGHT: 65 IN | BODY MASS INDEX: 28.99 KG/M2 | TEMPERATURE: 97.5 F | HEART RATE: 76 BPM | OXYGEN SATURATION: 95 % | SYSTOLIC BLOOD PRESSURE: 120 MMHG | WEIGHT: 174 LBS | DIASTOLIC BLOOD PRESSURE: 82 MMHG | RESPIRATION RATE: 16 BRPM

## 2024-01-09 DIAGNOSIS — G47.33 OSA (OBSTRUCTIVE SLEEP APNEA): Primary | ICD-10-CM

## 2024-01-09 PROCEDURE — 99213 OFFICE O/P EST LOW 20 MIN: CPT | Performed by: INTERNAL MEDICINE

## 2024-01-09 NOTE — PATIENT INSTRUCTIONS
[facility] Sleep Medicine Nursing Support:  When: Monday through Friday 7A-5PM except holidays  Where: Our direct line is [phone].    If you are having problems with equipment or need a mask fitting you may want to reach out to your durable medical equipment company first    IMPORTANT CONTACT PHONE NUMBERS:  Both the Sleep Medicine and Pulmonary Department manages CPAP/BIPAPs.  If depends on where you are typically seen on who to contact  [facility] Sleep Medicine Nursing Support: [phone] (if you are seen in a [facility] Sleep Medicine office)  [facility] Pulmonary: [phone] (if you are seen in a [facility] Pulmonary office)  [facility] Medical[facility] - [phone] ([employer]), [phone] ([place])  If you are unsure, please send me a message on [employer] and I can help    CPAP/BIPAP MAINTENANCE AND MANAGEMENT  1.  Continue use of CPAP/BIPAP equipment nightly - use any time you are laying down to rest, watch TV, etc to increase use and in case you fall asleep to prevent falling asleep without it  2.  If air is too hot, turn down the tubing heating setting  3.  If excessive dry mouth in the morning, turn up humidifier setting and be sure to only use distilled water in your humidifier.  You can also turn down the tubing heating setting  4.  Change your filter regularly and wash the non-disposable filter  5.  Continue to clean your equipment, as discussed, using mild soap and water.  You can use unscented baby wipe on the mask.  6.  Contact the Sleep Disorders Center with any questions or concerns prior to your next visit, as needed  7.  Schedule visit for follow-up in 6 months.  You should see your sleep physician at least once a year.      HOW TO CLEAN YOUR AUTO CPAP MACHINE  Mask: Clean the cushion of your mask daily. Dish soap and warm water.  (Usually eligible for mask cushions every 3 months)  2.  Headgear: Once a week. I find when you wash it daily it eats the material of the Velcro  faster.  (Usually eligible for new headgear every 6 months)  3.  Heated Tubing: Clean the tube every 3-7 days. One drop of dish soap run warm water through the tube then hang it over your shower curtain and let it drip dry. (Usually eligible every 3 months)  4.  Humidifier: Rinse out every 1-3 days. Dish soap warm water or , top shelf. (eligible every 6 months) **DISTILLED WATER ONLY**  5. Filters:  Dark blue (reusable for 6 months)- Rinse under warm water (no soap) sit and drip dry every 2-3 weeks.  (eligible for a new one every 6 months)  Light Blue (disposable) throw away every 2-4 weeks depending on how dirty it looks. (eligible for 6 every 3 months)    Medicare/Private Insurance Requirements with CPAP  Your insurance requires a face-to-face follow up visit within a 31-90 day period after starting CPAP.  Your insurance requires compliance with CPAP, which is at least 4 hours per night for 70% of the time. This must be done over a 30 day period and must occur within the initial 31-90 day period after starting CPAP.  Your insurance also requires at least yearly follow ups to continue to pay for CPAP supplies.  Check with your insurance and durable medical equipment company regarding supply replacements.     OTHER SLEEP MEDICINE ISSUES  If you are having a true emergency please call 911.  In the event that the line is busy or it is after hours please leave a voice message and we will return your call.  Please speak clearly, leaving your full name, birth date, best number to reach you and the reason for your call.   Medication refills: We will need the name of the medication, the dosage, the ordering provider, whether you get a 30 or 90 day refill, and the pharmacy name and address.  Medications will be ordered by the provider only.  Nurses cannot call in prescriptions.  Please allow 7 days for medication refills.  Physician requested updates: If your provider requested that you call with an update after  starting medication, please be ready to provide us the medication and dosage, what time you take your medication, the time you attempt to fall asleep, time you fall asleep, when you wake up, and what time you get out of bed.  Sleep Study Results: We will contact you with sleep study results and/or next steps after the physician has reviewed your testing.  Usually one of our nurses will call to talk about the results within 1-2 weeks of testing.

## 2024-01-09 NOTE — ASSESSMENT & PLAN NOTE
PSG in September 2023 that shows AHI 57.9 worse in the supine position and REM.  Amount of time below SPO2 90% was only 6.7 minutes.  Mostly supine sleep during the night.     She has had many years of snoring, dry mouth.  She does not have excessive daytime sleepiness.     She was very amenable for treatment with CPAP due to concurrent cardiovascular risks of untreated severe obstructive sleep apnea.  She has a sister who uses CPAP.     Started auto CPAP 4-20 cm H2O.  Could not tolerate full face mask.  Now on nasal cradles with mild improvement in tolerance.  She is getting used to CPAP more now after 3 months of use.    Compliance data:  1/7/2024  DME [employer] [facility]  93% use more than 4 hours  Average use 5 hours and 12 minutes  AirSense 10 4-20 cm H2O; EPR 2  Median pressure 9.5, 95th percentile 12.3  Median leak 4.9, 95th percentile 28.7  Residual AHI 1.8    She will try to increase use and become more accustomed to CPAP.  Decrease pressure to 4-12 cm H2O  Supply prescription provided  Follow-up in 6 months

## 2024-01-10 LAB

## 2024-03-09 LAB
CREAT ?TM UR-SCNC: 61 UMOL/L
EXT ALBUMIN URINE RANDOM: 0.3
HBA1C MFR BLD HPLC: 14 %
MICROALBUMIN/CREAT UR: 5 MG/G{CREAT}

## 2024-03-12 ENCOUNTER — OFFICE VISIT (OUTPATIENT)
Dept: ENDOCRINOLOGY | Facility: HOSPITAL | Age: 69
End: 2024-03-12
Payer: COMMERCIAL

## 2024-03-12 VITALS
BODY MASS INDEX: 28.82 KG/M2 | WEIGHT: 173 LBS | SYSTOLIC BLOOD PRESSURE: 122 MMHG | HEART RATE: 82 BPM | DIASTOLIC BLOOD PRESSURE: 70 MMHG | HEIGHT: 65 IN

## 2024-03-12 DIAGNOSIS — E78.2 MIXED HYPERLIPIDEMIA: ICD-10-CM

## 2024-03-12 DIAGNOSIS — E11.65 TYPE 2 DIABETES MELLITUS WITH HYPERGLYCEMIA, WITHOUT LONG-TERM CURRENT USE OF INSULIN (HCC): Primary | ICD-10-CM

## 2024-03-12 DIAGNOSIS — E11.42 DIABETIC POLYNEUROPATHY ASSOCIATED WITH TYPE 2 DIABETES MELLITUS (HCC): ICD-10-CM

## 2024-03-12 DIAGNOSIS — I10 PRIMARY HYPERTENSION: ICD-10-CM

## 2024-03-12 PROCEDURE — 95251 CONT GLUC MNTR ANALYSIS I&R: CPT | Performed by: INTERNAL MEDICINE

## 2024-03-12 PROCEDURE — 99214 OFFICE O/P EST MOD 30 MIN: CPT | Performed by: INTERNAL MEDICINE

## 2024-03-12 RX ORDER — SITAGLIPTIN AND METFORMIN HYDROCHLORIDE 1000; 100 MG/1; MG/1
1 TABLET, FILM COATED, EXTENDED RELEASE ORAL EVERY MORNING
Qty: 30 TABLET | Refills: 6 | Status: SHIPPED | OUTPATIENT
Start: 2024-03-12

## 2024-03-12 RX ORDER — METFORMIN HYDROCHLORIDE 500 MG/1
1000 TABLET, EXTENDED RELEASE ORAL
Qty: 60 TABLET | Refills: 6 | Status: SHIPPED | OUTPATIENT
Start: 2024-03-12

## 2024-03-12 RX ORDER — PIOGLITAZONEHYDROCHLORIDE 45 MG/1
45 TABLET ORAL DAILY
Qty: 30 TABLET | Refills: 6 | Status: SHIPPED | OUTPATIENT
Start: 2024-03-12

## 2024-03-12 NOTE — PROGRESS NOTES
3/12/2024    Assessment/Plan     1. Type 2 diabetes mellitus with hyperglycemia, without long-term current use of insulin (HCC)  -     Comprehensive metabolic panel; Future; Expected date: 05/27/2024  -     Hemoglobin A1C; Future; Expected date: 05/27/2024  -     Janumet -1000 MG TB24; Take 1 tablet by mouth every morning  -     metFORMIN (GLUCOPHAGE-XR) 500 mg 24 hr tablet; Take 2 tablets (1,000 mg total) by mouth daily with dinner  -     pioglitazone (ACTOS) 45 mg tablet; Take 1 tablet (45 mg total) by mouth daily    2. Diabetic polyneuropathy associated with type 2 diabetes mellitus (HCC)  -     Comprehensive metabolic panel; Future; Expected date: 05/27/2024  -     Hemoglobin A1C; Future; Expected date: 05/27/2024    3. Primary hypertension  -     Comprehensive metabolic panel; Future; Expected date: 05/27/2024  -     Hemoglobin A1C; Future; Expected date: 05/27/2024    4. Mixed hyperlipidemia  -     Comprehensive metabolic panel; Future; Expected date: 05/27/2024  -     Hemoglobin A1C; Future; Expected date: 05/27/2024         1. Type 2 Diabetes Mellitus.  Most recent hemoglobin A1c is excellent, demonstrating good control. She will continue the same Janumet, metformin, and pioglitazone. She will continue to utilize the FreeStyle Sweta continuous glucose monitoring system. She will continue to work on dietary changes and activity as able.    2. Diabetic neuropathy.  She has stable neuropathic symptoms. Diabetic foot exam was performed in the office today.    3. Hypertension.  She is normotensive in the office on her current dose of valsartan/HCTZ.    4. Hyperlipidemia.  Lipid profile is excellent. She will continue the same atorvastatin and fenofibrate.    I have asked her to follow up in 3 to 4 months with preceding hemoglobin A1c and CMP.      CC: Diabetes type II, blood pressure, lipid follow-up    History of Present Illness     HPI: Sarahi Gutierrez is a 68 y.o. female with type 2 diabetes diagnosed  20 years ago, hypertension, hyperlipidemia.      Diabetic complications include neuropathy. She denies nephropathy, retinopathy, heart attack, stroke, or claudication.     She has been managing type 2 diabetes for 2 decades, utilizing oral hypoglycemic agents at home, including Janumet /1000 mg in the morning, metformin  mg in two doses at dinner, and pioglitazone 45 mg once daily in the morning. She maintains regular urination and is consuming more water for health benefits, despite not feeling thirsty. She suspects that coffee consumption is interfering with her iron absorption. She consumes a significant amount of herbal tea at night and wakes up at least once during the night to urinate.     She is currently using a CPAP device and wakes up at least once around 3:00 AM or 4:00 AM, noting that it helps with her sleep quality. She experiences hunger pangs when her blood glucose levels drop, attempting to eat to counteract this. She denies excessive hunger, abdominal pain, or nausea. She took a teaspoon of liquid iron yesterday, which caused her stomach to feel queasy. She does not report constipation but has had diarrhea. She has not consulted a hematologist regarding her anemia episodes and needs to see a pulmonologist for issues related to her CPAP use.     Hypoglycemic episodes: No.     Blood Sugar/Glucometer/Pump/CGM review: She is currently using a Freestyle Sweta 2 continuous glucose monitoring system to test her blood sugars throughout the day.  Freestyle sweta download from 2/28/2024 through 3/12/2024 was reviewed in the office.  CGM was active only 44% of the time.  Average glucose is 138 mg/dL with a glucose variability of 17.7%.  94% of blood sugars are in target range, 6% high, 0% very high, 0% low, and 0% very low.  Before breakfast: She notices a couple of spikes in her blood glucose levels.     She is on atorvastatin 40 mg daily and fenofibrate 54 mg daily for the management of  hyperlipidemia. She denies experiencing chest pain or dyspnea.    She takes valsartan/HCTZ 320/25 mg once daily for blood pressure management and reports no significant headaches, lightheadedness, stroke-like symptoms, or dizziness.     Her last eye examination was in 2023, denies blurry vision but has been diagnosed with macular degeneration.     Her last foot examination was conducted in 2023. She reports no change in the numbness and tingling sensations in her feet. She experiences cramping but denies any foot wounds.       Last A1C was   Lab Results   Component Value Date    HGBA1C 6.9 (H) 2023   .        Review of Systems  The pertinent positive and negative findings are as noted in the HPI.    Historical Information   Past Medical History:   Diagnosis Date    Castelan's esophagus 2023    Bicornate uterus     Diabetes mellitus (HCC)     Diagnosed by family provider    GERD (gastroesophageal reflux disease)     Hyperlipidemia     Hypertension     Macular degeneration of left eye     Sleep apnea     Sleep apnea, obstructive 2013    Probably had it earlier in my life     Past Surgical History:   Procedure Laterality Date     SECTION      X 2    CHOLECYSTECTOMY      lap    TUBAL LIGATION Bilateral      Social History   Social History     Substance and Sexual Activity   Alcohol Use Yes    Alcohol/week: 2.0 standard drinks of alcohol    Types: 1 Glasses of wine, 1 Cans of beer per week    Comment: Reduced consumption of alcohol     Social History     Substance and Sexual Activity   Drug Use Never     Social History     Tobacco Use   Smoking Status Former    Current packs/day: 0.00    Average packs/day: 0.3 packs/day for 10.0 years (2.5 ttl pk-yrs)    Types: Cigarettes    Start date: 1975    Quit date: 1985    Years since quittin.2   Smokeless Tobacco Never   Tobacco Comments    Social habit, late teens through late 20s     Family History:   Family History   Problem  Relation Age of Onset    Myocarditis Mother     Melanoma Father     Diabetes type II Father             Stroke Father     Diabetes Father          at 85 yrs. - managed disease w/ metformin    Diabetes type II Sister         Recently diagnosed    Hypertension Sister         Managed with meds    No Known Problems Daughter     No Known Problems Daughter     Breast cancer Maternal Grandmother          at an early age    No Known Problems Maternal Grandfather     No Known Problems Paternal Grandmother     No Known Problems Paternal Grandfather     Hypertension Brother         Managed with meds    Diabetes type II Brother         Recently Diagnosed    Breast cancer Maternal Aunt         unknown age of onset    No Known Problems Maternal Aunt     No Known Problems Paternal Aunt     Diabetes type II Paternal Uncle                Meds/Allergies   Current Outpatient Medications   Medication Sig Dispense Refill    atorvastatin (LIPITOR) 40 mg tablet Take 40 mg by mouth in the morning        Continuous Blood Gluc  ([employer]) ALYSHA As directed      doxycycline (VIBRAMYCIN) Take 2 immediately at discovery of attached tick Oral 2 at once-single dose for non pregnant, non lactating adults      fenofibrate (TRICOR) 54 MG tablet take 1 tablet by mouth once daily with meals      ferrous sulfate 325 (65 Fe) mg tablet every 24 hours      fluticasone (Flonase Allergy Relief) 50 mcg/act nasal spray 2 sprays every 24 hours      Janumet -1000 MG TB24 Take 1 tablet by mouth every morning 30 tablet 6    Magnesium Cl-Calcium Carbonate (SLOW-MAG PO) Take by mouth in the morning      metFORMIN (GLUCOPHAGE-XR) 500 mg 24 hr tablet Take 2 tablets (1,000 mg total) by mouth daily with dinner 60 tablet 6    MISC NATURAL PRODUCTS EX Apply topically Magnesium cream to legs at night for leg cramps      Multiple Vitamins-Minerals (MULTIVITAMIN ADULTS 50+ PO) Orally      Multiple  "Vitamins-Minerals (PRESERVISION AREDS PO) Take by mouth in the morning      nitrofurantoin (MACRODANTIN) 50 mg capsule prn      omeprazole (PriLOSEC) 20 mg delayed release capsule Take 40 mg by mouth daily      pioglitazone (ACTOS) 45 mg tablet Take 1 tablet (45 mg total) by mouth daily 30 tablet 6    valsartan-hydrochlorothiazide (DIOVAN-HCT) 320-25 MG per tablet Take 1 tablet by mouth daily       No current facility-administered medications for this visit.     Allergies   Allergen Reactions    Jardiance [Empagliflozin] Other (See Comments)     Frequent yeast infections    Iodinated Contrast Media Rash       Objective   Vitals: Blood pressure 122/70, pulse 82, height 5' 5\" (1.651 m), weight 78.5 kg (173 lb).  Invasive Devices       None                   Physical Exam  Cardiovascular:      Pulses: no weak pulses.           Dorsalis pedis pulses are 2+ on the right side and 2+ on the left side.        Posterior tibial pulses are 2+ on the right side and 2+ on the left side.   Feet:      Right foot:      Skin integrity: No ulcer, skin breakdown, erythema, warmth, callus or dry skin.      Left foot:      Skin integrity: No ulcer, skin breakdown, erythema, warmth, callus or dry skin.       Physical exam normal with pertinent positives and negatives.  Neck: Thyroid normal in size. No palpable nodules.  Respiratory: Lungs are clear.  Cardiovascular: Heart regular. No murmurs.  Diabetic foot exam: No lower extremity edema. No ulcerations in the feet. Dorsalis pedis and posterior tibialis pulses 2+. Vibration sensation diminished to the first toe DIP joint bilaterally. Monofilament sensation intact to both feet except diminished at the heels over the thick skin.  Patient's shoes and socks removed.    Right Foot/Ankle   Right Foot Inspection  Skin Exam: skin normal and skin intact. No dry skin, no warmth, no callus, no erythema, no maceration, no abnormal color, no pre-ulcer, no ulcer and no callus.     Toe Exam: No " swelling and  no right toe deformity    Sensory   Vibration: diminished  Monofilament testing: intact    Vascular  Capillary refills: < 3 seconds  The right DP pulse is 2+. The right PT pulse is 2+.     Left Foot/Ankle  Left Foot Inspection  Skin Exam: skin normal and skin intact. No dry skin, no warmth, no erythema, no maceration, normal color, no pre-ulcer, no ulcer and no callus.     Toe Exam: No swelling and no left toe deformity.     Sensory   Vibration: diminished  Monofilament testing: intact    Vascular  Capillary refills: < 3 seconds  The left DP pulse is 2+. The left PT pulse is 2+.     Assign Risk Category  No deformity present  Loss of protective sensation  No weak pulses  Risk: 1        The history was obtained from the review of the chart and from the patient.    Lab Results:    Most recent Alc is  Lab Results   Component Value Date    HGBA1C 6.9 (H) 11/08/2023               Lab Results   Component Value Date    CREATININE 0.59 11/08/2023    CREATININE 0.66 07/11/2023    CREATININE 0.60 03/03/2023    BUN 15 11/08/2023    K 4.1 11/08/2023     11/08/2023    CO2 28 11/08/2023     eGFR   Date Value Ref Range Status   11/08/2023 99 > OR = 60 mL/min/1.73m2 Final         Lab Results   Component Value Date    HDL 67 11/08/2023    TRIG 110 11/08/2023       Lab Results   Component Value Date    ALT 15 11/08/2023    AST 12 11/08/2023    ALKPHOS 33 (L) 11/08/2023       Lab Results   Component Value Date    TSH 3.18 07/11/2023       Blood work performed on 03/09/2024 at [facility] showed a hemoglobin A1c of 6.9%.     CMP showed a glucose of 110 fasting, but was otherwise normal.    TSH is 3.42.    Urine microalbumin to creatinine ratio was 5.0.    Total cholesterol 135, triglycerides 75, HDL 66, LDL 53.     CBC does demonstrate anemia with a hemoglobin of 10.3 and a hematocrit of 31.9 with normal indices    Future Appointments   Date Time Provider Department Center   6/26/2024 11:00 AM Elisa Madera  MD [facility]       Transcribed for Elisa Madera MD, by Narda Velarde on 03/12/24 at 5:53 PM. Powered by [employer].

## 2024-03-12 NOTE — PATIENT INSTRUCTIONS
Hgba1c is 6.9%. this is excellent.     Continue the same janumet, metformin, and pioglitazone for now.     Continue to use the freestyle osman.     Continue to work on diet and exercise.     The rest of the blood work is good except the anemia. Discuss with the PCP about this.     Follow up in 3-4 months with blood work.

## 2024-03-20 ENCOUNTER — TELEPHONE (OUTPATIENT)
Dept: HEMATOLOGY ONCOLOGY | Facility: CLINIC | Age: 69
End: 2024-03-20

## 2024-03-20 NOTE — TELEPHONE ENCOUNTER
Patient calling to inform the [facility] that she faxed lab results to the [facility] fax number.  I informed patient that the lab results were in the [facility] fax.  I sent the lab results to [facility] to be scanned into patient's chart. Patient will also have her PCP fax over a referral and office visit from the referring provider. Patient verbalized her understanding.

## 2024-05-07 ENCOUNTER — TELEPHONE (OUTPATIENT)
Age: 69
End: 2024-05-07

## 2024-05-07 ENCOUNTER — OFFICE VISIT (OUTPATIENT)
Age: 69
End: 2024-05-07
Payer: COMMERCIAL

## 2024-05-07 VITALS
BODY MASS INDEX: 29.49 KG/M2 | OXYGEN SATURATION: 96 % | SYSTOLIC BLOOD PRESSURE: 123 MMHG | TEMPERATURE: 98.2 F | RESPIRATION RATE: 16 BRPM | WEIGHT: 177 LBS | HEART RATE: 78 BPM | HEIGHT: 65 IN | DIASTOLIC BLOOD PRESSURE: 69 MMHG

## 2024-05-07 DIAGNOSIS — D50.9 IRON DEFICIENCY ANEMIA, UNSPECIFIED IRON DEFICIENCY ANEMIA TYPE: Primary | ICD-10-CM

## 2024-05-07 DIAGNOSIS — D55.1: ICD-10-CM

## 2024-05-07 DIAGNOSIS — E53.8 B12 DEFICIENCY: ICD-10-CM

## 2024-05-07 PROCEDURE — 99204 OFFICE O/P NEW MOD 45 MIN: CPT | Performed by: NURSE PRACTITIONER

## 2024-05-07 RX ORDER — SODIUM CHLORIDE 9 MG/ML
20 INJECTION, SOLUTION INTRAVENOUS ONCE
OUTPATIENT
Start: 2024-05-20

## 2024-05-07 RX ORDER — CYANOCOBALAMIN 1000 UG/ML
1000 INJECTION, SOLUTION INTRAMUSCULAR; SUBCUTANEOUS ONCE
Start: 2024-05-20 | End: 2024-05-20

## 2024-05-07 NOTE — PROGRESS NOTES
Hematology/Oncology Outpatient Consult Note  Sarahi Gutierrez 68 y.o. female MRN: @ Encounter: [id number]        Date:  5/7/2024        CC: Anemia      HPI:  Sarahi Gutierrez is a 68-year-old female with a history of KERRY, type 2 diabetes, diabetic neuropathy, hypertension who is referred to hematology for evaluation of anemia. She is being seen for initial consultation 5/7/2024     Per review of labs on file, she has a history of mild normocytic anemia dating back to 3/2023.  Her hemoglobin at that time was 11.  Remainder of CBC with differential was normal  Over the past year, her hemoglobin has dropped to a low of 10.1.  White count, platelet count and differential are normal.  She has normocytic indices.  I still suspect she has a component of anemia of chronic disease.  However, recent iron panel and B12 stores have resulted in the low normal range.  Serum ferritin 22, serum B12 317.  Folate normal    Follows with GI at [facility] and has undergone complete work up which is negative for blood loss. She was found to have hiatal hernia, Castelan's esophagus   She has been on metformin as part of her diabetes regimen.  This medication is associated with B12 deficiency.    She has noticed symptoms including decreased energy levels, leg cramps/restless legs.  No chest pain, shortness of breath, lightheadedness or dizziness.  She is postmenopausal.  Denies any AUB.  No melena, hematochezia    She is up-to-date on all cancer screenings      Test Results:    Imaging: No results found.    Labs:   Lab Results   Component Value Date    WBC 6.1 11/08/2023    HGB 11.1 (L) 11/08/2023    HCT 34.0 (L) 11/08/2023    MCV 88.3 11/08/2023     11/08/2023     Lab Results   Component Value Date    K 4.1 11/08/2023     11/08/2023    CO2 28 11/08/2023    BUN 15 11/08/2023    CREATININE 0.59 11/08/2023    CALCIUM 9.8 11/08/2023    AST 12 11/08/2023    ALT 15 11/08/2023    ALKPHOS 33 (L) 11/08/2023    EGFR 99  2023             ROS:  Review of Systems   Constitutional:  Positive for fatigue.   All other systems reviewed and are negative.          Active Problems:   Patient Active Problem List   Diagnosis    Type 2 diabetes mellitus with hyperglycemia, without long-term current use of insulin (HCC)    Primary hypertension    Mixed hyperlipidemia    Diabetic polyneuropathy associated with type 2 diabetes mellitus (HCC)    KERRY (obstructive sleep apnea)    Iron deficiency anemia    B12 deficiency       Past Medical History:   Past Medical History:   Diagnosis Date    Castelan's esophagus 2023    Bicornate uterus     Diabetes mellitus (HCC)     Diagnosed by family provider    GERD (gastroesophageal reflux disease)     Hyperlipidemia     Hypertension     Macular degeneration of left eye     Sleep apnea     Sleep apnea, obstructive 2013    Probably had it earlier in my life       Surgical History:   Past Surgical History:   Procedure Laterality Date     SECTION      X 2    CHOLECYSTECTOMY      lap    TUBAL LIGATION Bilateral        Family History:    Family History   Problem Relation Age of Onset    Myocarditis Mother     Melanoma Father     Diabetes type II Father             Stroke Father     Diabetes Father          at 85 yrs. - managed disease w/ metformin    Diabetes type II Sister         Recently diagnosed    Hypertension Sister         Managed with meds    No Known Problems Daughter     No Known Problems Daughter     Breast cancer Maternal Grandmother          at an early age    No Known Problems Maternal Grandfather     No Known Problems Paternal Grandmother     No Known Problems Paternal Grandfather     Hypertension Brother         Managed with meds    Diabetes type II Brother         Recently Diagnosed    Breast cancer Maternal Aunt         unknown age of onset    No Known Problems Maternal Aunt     No Known Problems Paternal Aunt     Diabetes type II Paternal Uncle                 Cancer-related family history includes Breast cancer in her maternal aunt and maternal grandmother; Melanoma in her father.    Social History:   Social History     Socioeconomic History    Marital status: /Civil Union     Spouse name: Not on file    Number of children: Not on file    Years of education: Not on file    Highest education level: Not on file   Occupational History    Occupation: teacher     Comment: retired   Tobacco Use    Smoking status: Former     Current packs/day: 0.00     Average packs/day: 0.3 packs/day for 10.0 years (2.5 ttl pk-yrs)     Types: Cigarettes     Start date: 1975     Quit date: 1985     Years since quittin.3    Smokeless tobacco: Never    Tobacco comments:     Social habit, late teens through late 20s   Vaping Use    Vaping status: Never Used   Substance and Sexual Activity    Alcohol use: Yes     Alcohol/week: 2.0 standard drinks of alcohol     Types: 1 Glasses of wine, 1 Cans of beer per week     Comment: Reduced consumption of alcohol    Drug use: Never    Sexual activity: Yes     Partners: Male     Birth control/protection: Post-menopausal   Other Topics Concern    Not on file   Social History Narrative    Not on file     Social Determinants of Health     Financial Resource Strain: Not on file   Food Insecurity: Not on file   Transportation Needs: Not on file   Physical Activity: Not on file   Stress: Not on file   Social Connections: Not on file   Intimate Partner Violence: Not on file   Housing Stability: Not on file       Current Medications:   Current Outpatient Medications   Medication Sig Dispense Refill    atorvastatin (LIPITOR) 40 mg tablet Take 40 mg by mouth in the morning        doxycycline (VIBRAMYCIN) Take 2 immediately at discovery of attached tick Oral 2 at once-single dose for non pregnant, non lactating adults      fenofibrate (TRICOR) 54 MG tablet take 1 tablet by mouth once daily with meals      ferrous sulfate 325 (65 Fe)  mg tablet every 24 hours      fluticasone (Flonase Allergy Relief) 50 mcg/act nasal spray 2 sprays every 24 hours      Janumet -1000 MG TB24 Take 1 tablet by mouth every morning 30 tablet 6    Magnesium Cl-Calcium Carbonate (SLOW-MAG PO) Take by mouth in the morning      metFORMIN (GLUCOPHAGE-XR) 500 mg 24 hr tablet Take 2 tablets (1,000 mg total) by mouth daily with dinner 60 tablet 6    MISC NATURAL PRODUCTS EX Apply topically Magnesium cream to legs at night for leg cramps      Multiple Vitamins-Minerals (MULTIVITAMIN ADULTS 50+ PO) Orally      Multiple Vitamins-Minerals (PRESERVISION AREDS PO) Take by mouth in the morning      nitrofurantoin (MACRODANTIN) 50 mg capsule prn      omeprazole (PriLOSEC) 20 mg delayed release capsule Take 40 mg by mouth daily      pioglitazone (ACTOS) 45 mg tablet Take 1 tablet (45 mg total) by mouth daily 30 tablet 6    valsartan-hydrochlorothiazide (DIOVAN-HCT) 320-25 MG per tablet Take 1 tablet by mouth daily      Continuous Blood Gluc  (FreeStyle Sweta 14 Day Spreckels) ALYSHA As directed       No current facility-administered medications for this visit.       Allergies:   Allergies   Allergen Reactions    Jardiance [Empagliflozin] Other (See Comments)     Frequent yeast infections    Iodinated Contrast Media Rash         Physical Exam:    Body surface area is 1.88 meters squared.    Wt Readings from Last 3 Encounters:   05/07/24 80.3 kg (177 lb)   03/12/24 78.5 kg (173 lb)   01/09/24 78.9 kg (174 lb)        Temp Readings from Last 3 Encounters:   05/07/24 98.2 °F (36.8 °C) (Temporal)   01/09/24 97.5 °F (36.4 °C) (Tympanic)   11/10/23 97.8 °F (36.6 °C)        BP Readings from Last 3 Encounters:   05/07/24 123/69   03/12/24 122/70   01/09/24 120/82         Pulse Readings from Last 3 Encounters:   05/07/24 78   03/12/24 82   01/09/24 76          Physical Exam  Constitutional:       General: She is not in acute distress.     Appearance: Normal appearance.   HENT:      Head:  Normocephalic and atraumatic.   Eyes:      General: No scleral icterus.        Right eye: No discharge.         Left eye: No discharge.      Conjunctiva/sclera: Conjunctivae normal.   Cardiovascular:      Rate and Rhythm: Normal rate and regular rhythm.   Pulmonary:      Effort: Pulmonary effort is normal. No respiratory distress.      Breath sounds: Normal breath sounds.   Abdominal:      General: Bowel sounds are normal. There is no distension.      Palpations: Abdomen is soft. There is no mass.      Tenderness: There is no abdominal tenderness.   Musculoskeletal:         General: Normal range of motion.   Lymphadenopathy:      Cervical: No cervical adenopathy.      Upper Body:      Right upper body: No supraclavicular, axillary or pectoral adenopathy.      Left upper body: No supraclavicular, axillary or pectoral adenopathy.   Skin:     General: Skin is warm and dry.   Neurological:      General: No focal deficit present.      Mental Status: She is alert and oriented to person, place, and time.   Psychiatric:         Mood and Affect: Mood normal.         Behavior: Behavior normal.              Assessment/ Plan:    1. Iron deficiency anemia, unspecified iron deficiency anemia type    2. B12 deficiency    3. 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  On most recent blood work she was noted to have decreased iron and B12 stores.  Her hemoglobin over the past year has dropped from 11-10  GI workup has been negative for blood loss.  However she was noted to have hiatal hernia and Castelan's esophagus.  She has a history of type II DM and has been on metformin for some time    I suspect she has multifactorial anemia secondary to chronic disease, substrate deficiency from malabsorption from hiatal hernia, Castelan's esophagus and metformin is known to cause B12 deficiency.    She  "is unable to tolerate oral iron.  I do believe she would benefit from parenteral iron given her symptoms.  I would like to get updated blood work and have requested up-to-date CBC, iron panel, B12 stores.  She will have this done.  I have placed orders for IV Feraheme and explained we will work to obtain insurance authorization.  If denied by insurance, I would order IV Venofer 300 mg weekly x 4 doses.  I will give her injectable B12 with her iron infusions.  Depending on results of most recent blood work requested today, she may benefit from continuing injectable B12 replacement which could be given from her PCP.  I will notify her of results of blood work ordered today.  In the meantime, we will work on obtaining insurance authorization for iron infusions.  Patient verbalized understanding and is in agreement with this plan of care.  I will see her back in 4 months with repeat blood work to assess how she is feeling, how she tolerated iron infusions.  She is aware to call anytime with questions or concerns in the interim        Portions of the record may have been created with voice recognition software.  Occasional wrong word or \"sound a like\" substitutions may have occurred due to the inherent limitations of voice recognition software.  Read the chart carefully and recognize, using context, where substitutions have occurred.          "

## 2024-05-08 ENCOUNTER — TELEPHONE (OUTPATIENT)
Dept: HEMATOLOGY ONCOLOGY | Facility: CLINIC | Age: 69
End: 2024-05-08

## 2024-05-08 NOTE — TELEPHONE ENCOUNTER
Patient Call    Who are you speaking with? Patient    If it is not the patient, are they listed on an active communication consent form? N/A   What is the reason for this call? Pt is calling in regards to her upcoming iron infusions and the need to have a B12 injection. Pt is asking to have the injections done at the same time of the infusion if possible. Pt would like to speak to someone at [facility]'s office regarding this please. Please call pt back.    Does this require a call back? Yes   If a call back is required, please list best call back number [phone]   If a call back is required, advise that a message will be forwarded to their care team and someone will return their call as soon as possible.   Did you relay this information to the patient? Yes

## 2024-05-08 NOTE — TELEPHONE ENCOUNTER
Called and left a message for Marija letting her know that she is scheduled to receive both the B12 and iron infusions with her upcoming appointments. Left my teams number in case she had any other question/concerns.

## 2024-05-08 NOTE — TELEPHONE ENCOUNTER
"Received voicemail from patient:     \"Hi Guadalupe, this is Cathryn Salazar. I did get your voicemail message regarding my B12 injections being ordered and they will be given along with my iron infusions. Thanks for taking care of that. I was confused over how that would proceed and I didn't expect a call from the oncology lab that soon. But everything is set to go for those two weeks and I will be getting my blood work for Krys Pal as she had ordered by the end of this week. So that's that'll be on record too. Thanks again. You don't have to return my call. I think I now have everything taken care of that I wanted to have in place for those two sessions. Have a good day.\"    "

## 2024-05-09 ENCOUNTER — TELEPHONE (OUTPATIENT)
Age: 69
End: 2024-05-09

## 2024-05-09 DIAGNOSIS — E53.8 B12 DEFICIENCY: Primary | ICD-10-CM

## 2024-05-09 DIAGNOSIS — D50.9 IRON DEFICIENCY ANEMIA, UNSPECIFIED IRON DEFICIENCY ANEMIA TYPE: ICD-10-CM

## 2024-05-09 RX ORDER — CYANOCOBALAMIN 1000 UG/ML
1000 INJECTION, SOLUTION INTRAMUSCULAR; SUBCUTANEOUS ONCE
OUTPATIENT
Start: 2024-05-23 | End: 2024-05-23

## 2024-05-09 RX ORDER — SODIUM CHLORIDE 9 MG/ML
20 INJECTION, SOLUTION INTRAVENOUS ONCE
Status: CANCELLED | OUTPATIENT
Start: 2024-05-09

## 2024-05-09 RX ORDER — SODIUM CHLORIDE 9 MG/ML
20 INJECTION, SOLUTION INTRAVENOUS ONCE
OUTPATIENT
Start: 2024-05-23

## 2024-05-09 RX ORDER — CYANOCOBALAMIN 1000 UG/ML
1000 INJECTION, SOLUTION INTRAMUSCULAR; SUBCUTANEOUS ONCE
Status: CANCELLED | OUTPATIENT
Start: 2024-05-09 | End: 2024-05-09

## 2024-05-09 NOTE — TELEPHONE ENCOUNTER
Called patient to let her know that her insurance prefers venofer over feraheme. Venofer added to plan in place of feraheme. She is aware that this is 4 doses and there may be a time difference and that infusion will be reaching out in case her schedule needs to be changed.

## 2024-05-23 ENCOUNTER — HOSPITAL ENCOUNTER (OUTPATIENT)
Dept: INFUSION CENTER | Facility: HOSPITAL | Age: 69
Discharge: HOME | End: 2024-05-23
Attending: INTERNAL MEDICINE
Payer: COMMERCIAL

## 2024-05-23 VITALS
SYSTOLIC BLOOD PRESSURE: 132 MMHG | OXYGEN SATURATION: 97 % | TEMPERATURE: 98.1 F | RESPIRATION RATE: 18 BRPM | HEART RATE: 79 BPM | DIASTOLIC BLOOD PRESSURE: 71 MMHG

## 2024-05-23 DIAGNOSIS — E53.8 B12 DEFICIENCY: ICD-10-CM

## 2024-05-23 DIAGNOSIS — D50.9 IRON DEFICIENCY ANEMIA, UNSPECIFIED IRON DEFICIENCY ANEMIA TYPE: Primary | ICD-10-CM

## 2024-05-23 PROCEDURE — 96365 THER/PROPH/DIAG IV INF INIT: CPT

## 2024-05-23 PROCEDURE — 96366 THER/PROPH/DIAG IV INF ADDON: CPT

## 2024-05-23 PROCEDURE — 96372 THER/PROPH/DIAG INJ SC/IM: CPT

## 2024-05-23 RX ORDER — CYANOCOBALAMIN 1000 UG/ML
1000 INJECTION, SOLUTION INTRAMUSCULAR; SUBCUTANEOUS ONCE
Status: CANCELLED | OUTPATIENT
Start: 2024-05-30 | End: 2024-05-30

## 2024-05-23 RX ORDER — CYANOCOBALAMIN 1000 UG/ML
1000 INJECTION, SOLUTION INTRAMUSCULAR; SUBCUTANEOUS ONCE
Status: COMPLETED | OUTPATIENT
Start: 2024-05-23 | End: 2024-05-23

## 2024-05-23 RX ORDER — SODIUM CHLORIDE 9 MG/ML
20 INJECTION, SOLUTION INTRAVENOUS ONCE
Status: CANCELLED | OUTPATIENT
Start: 2024-05-30

## 2024-05-23 RX ORDER — SODIUM CHLORIDE 9 MG/ML
20 INJECTION, SOLUTION INTRAVENOUS ONCE
Status: COMPLETED | OUTPATIENT
Start: 2024-05-23 | End: 2024-05-23

## 2024-05-23 RX ADMIN — SODIUM CHLORIDE 20 ML/HR: 9 INJECTION, SOLUTION INTRAVENOUS at 08:53

## 2024-05-23 RX ADMIN — CYANOCOBALAMIN 1000 MCG: 1000 INJECTION, SOLUTION INTRAMUSCULAR at 08:55

## 2024-05-23 RX ADMIN — IRON SUCROSE 300 MG: 20 INJECTION, SOLUTION INTRAVENOUS at 08:53

## 2024-05-23 NOTE — PROGRESS NOTES
Patient infusion completed without complications and B12 injection administered. Patient discharged in stable condition home. AVS declined and patient verbalized next appointment on thursday. Patient ambulated off unit at this time.

## 2024-05-30 ENCOUNTER — HOSPITAL ENCOUNTER (OUTPATIENT)
Dept: INFUSION CENTER | Facility: HOSPITAL | Age: 69
Discharge: HOME | End: 2024-05-30
Attending: INTERNAL MEDICINE
Payer: COMMERCIAL

## 2024-05-30 VITALS
RESPIRATION RATE: 18 BRPM | DIASTOLIC BLOOD PRESSURE: 61 MMHG | TEMPERATURE: 97.4 F | HEART RATE: 78 BPM | SYSTOLIC BLOOD PRESSURE: 127 MMHG | OXYGEN SATURATION: 99 %

## 2024-05-30 DIAGNOSIS — E53.8 B12 DEFICIENCY: ICD-10-CM

## 2024-05-30 DIAGNOSIS — D50.9 IRON DEFICIENCY ANEMIA, UNSPECIFIED IRON DEFICIENCY ANEMIA TYPE: Primary | ICD-10-CM

## 2024-05-30 PROCEDURE — 96365 THER/PROPH/DIAG IV INF INIT: CPT

## 2024-05-30 PROCEDURE — 96372 THER/PROPH/DIAG INJ SC/IM: CPT

## 2024-05-30 RX ORDER — SODIUM CHLORIDE 9 MG/ML
20 INJECTION, SOLUTION INTRAVENOUS ONCE
Status: CANCELLED | OUTPATIENT
Start: 2024-06-06

## 2024-05-30 RX ORDER — CYANOCOBALAMIN 1000 UG/ML
1000 INJECTION, SOLUTION INTRAMUSCULAR; SUBCUTANEOUS ONCE
Status: CANCELLED | OUTPATIENT
Start: 2024-06-06 | End: 2024-06-06

## 2024-05-30 RX ORDER — CYANOCOBALAMIN 1000 UG/ML
1000 INJECTION, SOLUTION INTRAMUSCULAR; SUBCUTANEOUS ONCE
Status: COMPLETED | OUTPATIENT
Start: 2024-05-30 | End: 2024-05-30

## 2024-05-30 RX ORDER — SODIUM CHLORIDE 9 MG/ML
20 INJECTION, SOLUTION INTRAVENOUS ONCE
Status: COMPLETED | OUTPATIENT
Start: 2024-05-30 | End: 2024-05-30

## 2024-05-30 RX ADMIN — CYANOCOBALAMIN 1000 MCG: 1000 INJECTION, SOLUTION INTRAMUSCULAR at 09:07

## 2024-05-30 RX ADMIN — IRON SUCROSE 300 MG: 20 INJECTION, SOLUTION INTRAVENOUS at 09:12

## 2024-05-30 RX ADMIN — SODIUM CHLORIDE 20 ML/HR: 9 INJECTION, SOLUTION INTRAVENOUS at 09:12

## 2024-05-30 NOTE — PROGRESS NOTES
Sarahi Gutierrez  tolerated treatment well with no complications.      Sarahi Gutierrez is aware of future appt on 6/6 at 1300.     AVS printed and given to Sarahi Gutierrez:    No (Declined by Sarahi Gutierrez)        [id number]

## 2024-06-06 ENCOUNTER — HOSPITAL ENCOUNTER (OUTPATIENT)
Dept: INFUSION CENTER | Facility: HOSPITAL | Age: 69
Discharge: HOME | End: 2024-06-06
Attending: INTERNAL MEDICINE
Payer: COMMERCIAL

## 2024-06-06 VITALS
SYSTOLIC BLOOD PRESSURE: 147 MMHG | OXYGEN SATURATION: 99 % | HEART RATE: 74 BPM | DIASTOLIC BLOOD PRESSURE: 74 MMHG | TEMPERATURE: 96.5 F | RESPIRATION RATE: 18 BRPM

## 2024-06-06 DIAGNOSIS — E53.8 B12 DEFICIENCY: ICD-10-CM

## 2024-06-06 DIAGNOSIS — D50.9 IRON DEFICIENCY ANEMIA, UNSPECIFIED IRON DEFICIENCY ANEMIA TYPE: Primary | ICD-10-CM

## 2024-06-06 PROCEDURE — 96372 THER/PROPH/DIAG INJ SC/IM: CPT

## 2024-06-06 PROCEDURE — 96365 THER/PROPH/DIAG IV INF INIT: CPT

## 2024-06-06 PROCEDURE — 96366 THER/PROPH/DIAG IV INF ADDON: CPT

## 2024-06-06 RX ORDER — CYANOCOBALAMIN 1000 UG/ML
1000 INJECTION, SOLUTION INTRAMUSCULAR; SUBCUTANEOUS ONCE
Status: COMPLETED | OUTPATIENT
Start: 2024-06-06 | End: 2024-06-06

## 2024-06-06 RX ORDER — SODIUM CHLORIDE 9 MG/ML
20 INJECTION, SOLUTION INTRAVENOUS ONCE
Status: COMPLETED | OUTPATIENT
Start: 2024-06-06 | End: 2024-06-06

## 2024-06-06 RX ORDER — CYANOCOBALAMIN 1000 UG/ML
1000 INJECTION, SOLUTION INTRAMUSCULAR; SUBCUTANEOUS ONCE
Status: CANCELLED | OUTPATIENT
Start: 2024-06-13 | End: 2024-06-13

## 2024-06-06 RX ORDER — SODIUM CHLORIDE 9 MG/ML
20 INJECTION, SOLUTION INTRAVENOUS ONCE
Status: CANCELLED | OUTPATIENT
Start: 2024-06-13

## 2024-06-06 RX ADMIN — SODIUM CHLORIDE 20 ML/HR: 9 INJECTION, SOLUTION INTRAVENOUS at 13:16

## 2024-06-06 RX ADMIN — CYANOCOBALAMIN 1000 MCG: 1000 INJECTION, SOLUTION INTRAMUSCULAR at 13:24

## 2024-06-06 RX ADMIN — IRON SUCROSE 300 MG: 20 INJECTION, SOLUTION INTRAVENOUS at 13:20

## 2024-06-06 NOTE — PROGRESS NOTES
Sarahi Gutierrez  tolerated treatment well with no complications.      Sarahi Gutierrez is aware of future appt on 6/13/24 at 1130.     AVS printed and given to Sarahi Gutierrez:  No (Declined by Sarahi Gutierrez)

## 2024-06-13 ENCOUNTER — HOSPITAL ENCOUNTER (OUTPATIENT)
Dept: INFUSION CENTER | Facility: HOSPITAL | Age: 69
Discharge: HOME | End: 2024-06-13
Attending: INTERNAL MEDICINE
Payer: COMMERCIAL

## 2024-06-13 VITALS
RESPIRATION RATE: 16 BRPM | SYSTOLIC BLOOD PRESSURE: 133 MMHG | DIASTOLIC BLOOD PRESSURE: 66 MMHG | OXYGEN SATURATION: 98 % | TEMPERATURE: 97.9 F | HEART RATE: 83 BPM

## 2024-06-13 DIAGNOSIS — D50.9 IRON DEFICIENCY ANEMIA, UNSPECIFIED IRON DEFICIENCY ANEMIA TYPE: Primary | ICD-10-CM

## 2024-06-13 DIAGNOSIS — E53.8 B12 DEFICIENCY: ICD-10-CM

## 2024-06-13 PROCEDURE — 96372 THER/PROPH/DIAG INJ SC/IM: CPT

## 2024-06-13 PROCEDURE — 96365 THER/PROPH/DIAG IV INF INIT: CPT

## 2024-06-13 PROCEDURE — 96366 THER/PROPH/DIAG IV INF ADDON: CPT

## 2024-06-13 RX ORDER — CYANOCOBALAMIN 1000 UG/ML
1000 INJECTION, SOLUTION INTRAMUSCULAR; SUBCUTANEOUS ONCE
Status: COMPLETED | OUTPATIENT
Start: 2024-06-13 | End: 2024-06-13

## 2024-06-13 RX ORDER — SODIUM CHLORIDE 9 MG/ML
20 INJECTION, SOLUTION INTRAVENOUS ONCE
Status: COMPLETED | OUTPATIENT
Start: 2024-06-13 | End: 2024-06-13

## 2024-06-13 RX ORDER — SODIUM CHLORIDE 9 MG/ML
20 INJECTION, SOLUTION INTRAVENOUS ONCE
Status: CANCELLED | OUTPATIENT
Start: 2024-06-13

## 2024-06-13 RX ORDER — CYANOCOBALAMIN 1000 UG/ML
1000 INJECTION, SOLUTION INTRAMUSCULAR; SUBCUTANEOUS ONCE
Status: CANCELLED | OUTPATIENT
Start: 2024-06-13 | End: 2024-06-13

## 2024-06-13 RX ADMIN — IRON SUCROSE 300 MG: 20 INJECTION, SOLUTION INTRAVENOUS at 11:44

## 2024-06-13 RX ADMIN — SODIUM CHLORIDE 20 ML/HR: 9 INJECTION, SOLUTION INTRAVENOUS at 11:44

## 2024-06-13 RX ADMIN — CYANOCOBALAMIN 1000 MCG: 1000 INJECTION, SOLUTION INTRAMUSCULAR at 11:43

## 2024-06-13 NOTE — PROGRESS NOTES
Sarahi Gutierrez  tolerated treatment well with no complications.      Sarahi Gutierrez has completed treatment and has no further appointments within the infusion center at this time.     AVS printed and given to Sarahi Gutierrez:    No (Declined by Sarahi Gutierrez)

## 2024-06-14 ENCOUNTER — TELEPHONE (OUTPATIENT)
Dept: HEMATOLOGY ONCOLOGY | Facility: CLINIC | Age: 69
End: 2024-06-14

## 2024-06-14 NOTE — TELEPHONE ENCOUNTER
Attempted to contact patient.  Left voicemail informing patient that if she was not tolerating oral iron supplements, she does not need to continue with them, as that was the reason for receiving iron infusions.  If she would like to continue with the B12 injections, as per Krys Pal, she should f/u with PCP to schedule.  Patient is seeing Krys Pal on 9/9 and instructed patient to have blood work done prior to appointment.

## 2024-06-14 NOTE — TELEPHONE ENCOUNTER
Patient Call    Who are you speaking with? Patient    If it is not the patient, are they listed on an active communication consent form? N/A   What is the reason for this call? Marija is calling regarding her iron infusions, she had her last one yesterday.  She is wondering if she should now start to take iron supplements.    Also when should she have her blood work done to check her levels?    Does this require a call back? Yes   If a call back is required, please list best call back number [phone]    If a call back is required, advise that a message will be forwarded to their care team and someone will return their call as soon as possible.   Did you relay this information to the patient? Yes

## 2024-06-25 ENCOUNTER — TELEPHONE (OUTPATIENT)
Age: 69
End: 2024-06-25

## 2024-06-25 DIAGNOSIS — E11.65 TYPE 2 DIABETES MELLITUS WITH HYPERGLYCEMIA, WITHOUT LONG-TERM CURRENT USE OF INSULIN (HCC): Primary | ICD-10-CM

## 2024-06-25 LAB
ALBUMIN SERPL-MCNC: 4.4 G/DL (ref 3.6–5.1)
ALBUMIN/GLOB SERPL: 2 (CALC) (ref 1–2.5)
ALP SERPL-CCNC: 29 U/L (ref 37–153)
ALT SERPL-CCNC: 16 U/L (ref 6–29)
AST SERPL-CCNC: 15 U/L (ref 10–35)
BILIRUB SERPL-MCNC: 0.4 MG/DL (ref 0.2–1.2)
BUN SERPL-MCNC: 24 MG/DL (ref 7–25)
BUN/CREAT SERPL: ABNORMAL (CALC) (ref 6–22)
CALCIUM SERPL-MCNC: 9.7 MG/DL (ref 8.6–10.4)
CHLORIDE SERPL-SCNC: 101 MMOL/L (ref 98–110)
CO2 SERPL-SCNC: 27 MMOL/L (ref 20–32)
CREAT SERPL-MCNC: 0.68 MG/DL (ref 0.5–1.05)
GFR/BSA.PRED SERPLBLD CYS-BASED-ARV: 95 ML/MIN/1.73M2
GLOBULIN SER CALC-MCNC: 2.2 G/DL (CALC) (ref 1.9–3.7)
GLUCOSE SERPL-MCNC: 128 MG/DL (ref 65–99)
HBA1C MFR BLD: 7.3 % OF TOTAL HGB
POTASSIUM SERPL-SCNC: 4.4 MMOL/L (ref 3.5–5.3)
PROT SERPL-MCNC: 6.6 G/DL (ref 6.1–8.1)
SODIUM SERPL-SCNC: 137 MMOL/L (ref 135–146)

## 2024-06-25 RX ORDER — PIOGLITAZONEHYDROCHLORIDE 45 MG/1
45 TABLET ORAL DAILY
Qty: 90 TABLET | Refills: 0 | Status: SHIPPED | OUTPATIENT
Start: 2024-06-25

## 2024-06-25 NOTE — TELEPHONE ENCOUNTER
Patient requesting a 90 day supply of pioglitazone. Can we change the script to 90 day supply and resend to the pharmacy?

## 2024-06-26 ENCOUNTER — OFFICE VISIT (OUTPATIENT)
Dept: ENDOCRINOLOGY | Facility: HOSPITAL | Age: 69
End: 2024-06-26
Payer: COMMERCIAL

## 2024-06-26 VITALS
HEART RATE: 80 BPM | WEIGHT: 174.4 LBS | HEIGHT: 66 IN | BODY MASS INDEX: 28.03 KG/M2 | SYSTOLIC BLOOD PRESSURE: 136 MMHG | DIASTOLIC BLOOD PRESSURE: 68 MMHG

## 2024-06-26 DIAGNOSIS — I10 PRIMARY HYPERTENSION: ICD-10-CM

## 2024-06-26 DIAGNOSIS — E11.42 DIABETIC POLYNEUROPATHY ASSOCIATED WITH TYPE 2 DIABETES MELLITUS (HCC): ICD-10-CM

## 2024-06-26 DIAGNOSIS — E78.2 MIXED HYPERLIPIDEMIA: ICD-10-CM

## 2024-06-26 DIAGNOSIS — E11.65 TYPE 2 DIABETES MELLITUS WITH HYPERGLYCEMIA, WITHOUT LONG-TERM CURRENT USE OF INSULIN (HCC): Primary | ICD-10-CM

## 2024-06-26 PROCEDURE — 99214 OFFICE O/P EST MOD 30 MIN: CPT | Performed by: INTERNAL MEDICINE

## 2024-06-26 RX ORDER — METFORMIN HYDROCHLORIDE 500 MG/1
1000 TABLET, EXTENDED RELEASE ORAL
Qty: 180 TABLET | Refills: 3 | Status: SHIPPED | OUTPATIENT
Start: 2024-06-26

## 2024-06-26 RX ORDER — SITAGLIPTIN AND METFORMIN HYDROCHLORIDE 1000; 100 MG/1; MG/1
1 TABLET, FILM COATED, EXTENDED RELEASE ORAL EVERY MORNING
Qty: 90 TABLET | Refills: 3 | Status: SHIPPED | OUTPATIENT
Start: 2024-06-26

## 2024-06-26 NOTE — PROGRESS NOTES
6/27/2024    Assessment & Plan      Diagnoses and all orders for this visit:    Type 2 diabetes mellitus with hyperglycemia, without long-term current use of insulin (HCC)  -     Janumet -1000 MG TB24; Take 1 tablet by mouth every morning  -     metFORMIN (GLUCOPHAGE-XR) 500 mg 24 hr tablet; Take 2 tablets (1,000 mg total) by mouth daily with dinner  -     Comprehensive metabolic panel; Future  -     Hemoglobin A1C; Future    Diabetic polyneuropathy associated with type 2 diabetes mellitus (HCC)  -     Comprehensive metabolic panel; Future  -     Hemoglobin A1C; Future    Primary hypertension  -     Comprehensive metabolic panel; Future  -     Hemoglobin A1C; Future    Mixed hyperlipidemia  -     Comprehensive metabolic panel; Future  -     Hemoglobin A1C; Future          Assessment & Plan  1. Type 2 diabetes.  The patient's hemoglobin A1c level is 7.3 percent, which is higher than her usual levels. Currently, she acknowledges a lack of adherence to her dietary regimen and inconsistent use of the CGM system. Consequently, she will maintain her current regimen of metformin, Janumet, and pioglitazone, along with regular monitoring of glucose levels.    2. Diabetic neuropathy.  Her diabetic foot exams are current.    3. Hypertension.  Her blood pressure is normotensive in the office. The current regimen of valsartan/HCTZ will be maintained.    4. Hyperlipidemia.  She will maintain her current regimen of atorvastatin 40 mg daily.    Follow-up  A follow-up visit is scheduled for 3 months from now, with pre-visit hemoglobin A1c and CMP to be conducted prior to the visit.        CC: Diabetes type II, blood pressure, lipid follow-up     History of Present Illness    HPI: Sarahi Gutierrez is a 68-year-old female with type 2 diabetes diagnosed 20 years ago, hypertension, hyperlipidemia with diabetic neuropathy, here for follow-up visit.     Diabetic complications include diabetic neuropathy. She denies nephropathy,  retinopathy, heart attack, stroke, or claudication.     Her current medication regimen includes Janumet /1000 mg in the morning, metformin  mg 2 tablets in the evening, and pioglitazone 45 mg daily. She reports nocturia, waking up once per night to urinate, and increased thirst, which she attempts to improve through increased water intake and increased consumption of mineral bello such as potassium and magnesium. She is making efforts to reduce her carbohydrate intake.     She denies experiencing blurred vision, but reports occasional numbness and tingling in her hands.  She denies numbness or tingling of the feet.  Diabetic foot exam was performed in March 2024.    She has hyperlipidemia and takes atorvastatin 40 mg daily and fenofibrate 54 mg daily.  She denies experiencing chest pain or shortness of breath.    She has hypertension and takes valsartan/HCTZ 320/25 mg daily.    Supplemental Information  She has been trying to get to the bottom of her red blood cells and hemoglobin issues for not new period of time. She finally got to a hematologist over at the [facility] and they sent her to their infusion center to get the 4 treatments of IV iron and B12. Last summer, she had Castelan's esophagus and having to bump up the omeprazole, her nutrients are not being absorbed. She is wearing a CPAP now. She has macular degeneration. She will see her eye doctor in 08/2024.    Blood Sugar/Glucometer/Pump/CGM review:   The patient's glycemic control is suboptimal, with morning readings ranging from 144 to 145. She acknowledges a lack of adherence to her dietary regimen and inconsistent use of the CGM system.She occasionally monitors her blood glucose levels via fingerstick, which typically averages around 130.    Historical Information   Past Medical History:   Diagnosis Date    Castelan's esophagus 07/2023    Bicornate uterus     Diabetes mellitus (HCC) 2010/2011    Diagnosed by family provider    GERD  (gastroesophageal reflux disease)     Hyperlipidemia     Hypertension     Macular degeneration of left eye     Sleep apnea     Sleep apnea, obstructive 2013    Probably had it earlier in my life     Past Surgical History:   Procedure Laterality Date     SECTION      X 2    CHOLECYSTECTOMY      lap    TUBAL LIGATION Bilateral      Social History   Social History     Substance and Sexual Activity   Alcohol Use Yes    Alcohol/week: 2.0 standard drinks of alcohol    Types: 1 Glasses of wine, 1 Cans of beer per week    Comment: Reduced consumption     Social History     Substance and Sexual Activity   Drug Use Never     Social History     Tobacco Use   Smoking Status Former    Current packs/day: 0.00    Average packs/day: 0.3 packs/day for 10.0 years (2.5 ttl pk-yrs)    Types: Cigarettes    Start date: 1975    Quit date: 1985    Years since quittin.5   Smokeless Tobacco Never   Tobacco Comments    Social habit, late teens through late 20s     Family History:   Family History   Problem Relation Age of Onset    Myocarditis Mother     Melanoma Father     Diabetes type II Father             Stroke Father     Diabetes Father          at 85 yrs. - managed disease w/ metformin    Diabetes type II Sister         Recently diagnosed    Hypertension Sister         Managed with meds    No Known Problems Daughter     No Known Problems Daughter     Breast cancer Maternal Grandmother          at an early age    No Known Problems Maternal Grandfather     No Known Problems Paternal Grandmother     No Known Problems Paternal Grandfather     Hypertension Brother         Managed with meds    Diabetes type II Brother         Recently Diagnosed    Breast cancer Maternal Aunt         unknown age of onset    No Known Problems Maternal Aunt     No Known Problems Paternal Aunt     Diabetes type II Paternal Uncle                Meds/Allergies   Current Outpatient Medications   Medication Sig  "Dispense Refill    atorvastatin (LIPITOR) 40 mg tablet Take 40 mg by mouth in the morning        Continuous Blood Gluc  (FreeStyle Sweta 14 Day [place]) ALYSHA As directed      doxycycline (VIBRAMYCIN) Take 2 immediately at discovery of attached tick Oral 2 at once-single dose for non pregnant, non lactating adults      fenofibrate (TRICOR) 54 MG tablet take 1 tablet by mouth once daily with meals      ferrous sulfate 325 (65 Fe) mg tablet every 24 hours      fluticasone (Flonase Allergy Relief) 50 mcg/act nasal spray 2 sprays every 24 hours      Janumet -1000 MG TB24 Take 1 tablet by mouth every morning 90 tablet 3    Magnesium Cl-Calcium Carbonate (SLOW-MAG PO) Take by mouth in the morning      metFORMIN (GLUCOPHAGE-XR) 500 mg 24 hr tablet Take 2 tablets (1,000 mg total) by mouth daily with dinner 180 tablet 3    MISC NATURAL PRODUCTS EX Apply topically Magnesium cream to legs at night for leg cramps      Multiple Vitamins-Minerals (MULTIVITAMIN ADULTS 50+ PO) Orally      Multiple Vitamins-Minerals (PRESERVISION AREDS PO) Take by mouth in the morning      nitrofurantoin (MACRODANTIN) 50 mg capsule prn      omeprazole (PriLOSEC) 20 mg delayed release capsule Take 40 mg by mouth daily      pioglitazone (ACTOS) 45 mg tablet Take 1 tablet (45 mg total) by mouth daily 90 tablet 0    valsartan-hydrochlorothiazide (DIOVAN-HCT) 320-25 MG per tablet Take 1 tablet by mouth daily       No current facility-administered medications for this visit.     Allergies   Allergen Reactions    Jardiance [Empagliflozin] Other (See Comments)     Frequent yeast infections    Iodinated Contrast Media Rash       Objective   Vitals: Blood pressure 136/68, pulse 80, height 5' 6\" (1.676 m), weight 79.1 kg (174 lb 6.4 oz).  Invasive Devices       None                   Physical Exam  Physical exam normal with pertinent positives and negatives.    Thyroid is normal in size. No palpable thyroid nodules.  Lungs are clear to " auscultation.  Heart has a regular rate and rhythm. No murmurs.  No lower extremity edema in the musculoskeletal system.      The history was obtained from the review of the chart and from the patient.    Lab Results:    Most recent Alc is  Lab Results   Component Value Date    HGBA1C 7.3 (H) 06/24/2024           Blood work performed on 6/24/2024 showed a CMP with a glucose of 128 fasting, alkaline phosphatase 29, but was otherwise normal.    Lab Results   Component Value Date    CREATININE 0.68 06/24/2024    CREATININE 0.59 11/08/2023    CREATININE 0.66 07/11/2023    BUN 24 06/24/2024    K 4.4 06/24/2024     06/24/2024    CO2 27 06/24/2024     eGFR   Date Value Ref Range Status   06/24/2024 95 > OR = 60 mL/min/1.73m2 Final         Lab Results   Component Value Date    HDL 67 11/08/2023    TRIG 110 11/08/2023       Lab Results   Component Value Date    ALT 16 06/24/2024    AST 15 06/24/2024    ALKPHOS 29 (L) 06/24/2024       Lab Results   Component Value Date    TSH 3.18 07/11/2023             Future Appointments   Date Time Provider Department Center   9/9/2024 11:30 AM TAYLOR Mohamud [facility]   11/6/2024 11:00 AM Elisa Madera MD [facility]

## 2024-06-26 NOTE — PATIENT INSTRUCTIONS
Hgba1c is 7.3%. this is not bad but higher.     Get back on track with diet and using the sensor.,     For now, no change in januvia, metformin, and pioglitazone.     Follow up in 3 months with blood work.

## 2024-06-28 ENCOUNTER — TELEPHONE (OUTPATIENT)
Dept: PULMONOLOGY | Facility: CLINIC | Age: 69
End: 2024-06-28

## 2024-06-28 NOTE — TELEPHONE ENCOUNTER
LVM for PT in regards to scheduling a 6M// COMPLIANCE FU with  in [place].    PT was on recall list to schedule appt.

## 2024-08-20 LAB
LEFT EYE DIABETIC RETINOPATHY: NORMAL
RIGHT EYE DIABETIC RETINOPATHY: NORMAL

## 2024-08-29 ENCOUNTER — TELEPHONE (OUTPATIENT)
Age: 69
End: 2024-08-29

## 2024-09-06 ENCOUNTER — TELEPHONE (OUTPATIENT)
Age: 69
End: 2024-09-06

## 2024-09-06 NOTE — PROGRESS NOTES
HEMATOLOGY / ONCOLOGY CLINIC FOLLOW UP NOTE    Primary Care Provider: Mireya Morocho MD  Referring Provider:    MRN: [id number]  : 1955    Reason for Encounter: Follow-up for anemia     Interval History: Patient returns for follow-up for multifactorial anemia secondary to chronic disease, substrate deficiency from malabsorption from hiatal hernia, Castelan's esophagus, as well as long-term use of metformin which is known to cause B12 deficiency.  She was treated with a course of IV Venofer and injectable B12 replacement at the end of May/early .  Blood work completed prior to today's visit shows resolution of iron deficiency.  She continues to have normocytic anemia, hemoglobin stable 10.7.  Serum B12 is low at 270  She does note some improvement in energy stores since completing iron infusions.  Denies any specific concerns today      REVIEW OF SYSTEMS:  Please note that a 14-point review of systems was performed to include Constitutional, HEENT, Respiratory, CVS, GI, , Musculoskeletal, Integumentary, Neurologic, Rheumatologic, Endocrinologic, Psychiatric, Lymphatic, and Hematologic/Oncologic systems were reviewed and are negative unless otherwise stated in HPI. Positive and negative findings pertinent to this evaluation are incorporated into the history of present illness.      ECOG PS: 0    PROBLEM LIST:  Patient Active Problem List   Diagnosis    Type 2 diabetes mellitus with hyperglycemia, without long-term current use of insulin (HCC)    Primary hypertension    Mixed hyperlipidemia    Diabetic polyneuropathy associated with type 2 diabetes mellitus (HCC)    KERRY (obstructive sleep apnea)    Iron deficiency anemia    B12 deficiency       Assessment / Plan:  1. B12 deficiency    2. Iron deficiency anemia, unspecified iron deficiency anemia type      Patient is a pleasant 68-year-old female who was referred to hematology for evaluation and management of anemia.  At presentation, she did have  decreased iron and B12 stores.  GI workup was negative for blood loss.  However she was noted to have hiatal hernia and Castelan's esophagus.  She does have a history of type 2 diabetes and has been on metformin for some time.    She was recently treated with a course of IV iron which did normalize her iron stores.  She does remain mildly anemic with low serum B12 levels. I suspect she has multifactorial anemia secondary to chronic disease, substrate deficiency from malabsorption from hiatal hernia, Castelan's esophagus and metformin as this is known to cause B12 deficiency.     I have recommended parenteral B12 replacement as I am concerned oral B12 will not be fully absorbed.  Patient is in agreement.  I will set her up for B12 1000 mcg IM monthly.  I will see her back in 6 months with repeat blood work.  Patient is in agreement with this plan of care.  She is instructed to call anytime with questions or concerns in the interim    I spent 20 minutes on chart review, face to face counseling time, coordination of care and documentation.    Past Medical History:   has a past medical history of Castelan's esophagus (2023), Bicornate uterus, Diabetes mellitus (HCC) (), GERD (gastroesophageal reflux disease), Hyperlipidemia, Hypertension, Macular degeneration of left eye, Sleep apnea, and Sleep apnea, obstructive (2013).    PAST SURGICAL HISTORY:   has a past surgical history that includes Cholecystectomy;  section; and Tubal ligation (Bilateral).    CURRENT MEDICATIONS  Current Outpatient Medications   Medication Sig Dispense Refill    atorvastatin (LIPITOR) 40 mg tablet Take 40 mg by mouth in the morning        Continuous Blood Gluc  (FreeStyle Sweta 14 Day [place]) ALYSHA As directed      doxycycline (VIBRAMYCIN) Take 2 immediately at discovery of attached tick Oral 2 at once-single dose for non pregnant, non lactating adults      fenofibrate (TRICOR) 54 MG tablet take 1 tablet by mouth once  daily with meals      ferrous sulfate 325 (65 Fe) mg tablet every 24 hours      fluticasone (Flonase Allergy Relief) 50 mcg/act nasal spray 2 sprays every 24 hours      Janumet -1000 MG TB24 Take 1 tablet by mouth every morning 90 tablet 3    Magnesium Cl-Calcium Carbonate (SLOW-MAG PO) Take by mouth in the morning      metFORMIN (GLUCOPHAGE-XR) 500 mg 24 hr tablet Take 2 tablets (1,000 mg total) by mouth daily with dinner 180 tablet 3    MISC NATURAL PRODUCTS EX Apply topically Magnesium cream to legs at night for leg cramps      Multiple Vitamins-Minerals (MULTIVITAMIN ADULTS 50+ PO) Orally      Multiple Vitamins-Minerals (PRESERVISION AREDS PO) Take by mouth in the morning      nitrofurantoin (MACRODANTIN) 50 mg capsule prn      omeprazole (PriLOSEC) 20 mg delayed release capsule Take 40 mg by mouth daily      pioglitazone (ACTOS) 45 mg tablet Take 1 tablet (45 mg total) by mouth daily 90 tablet 0    valsartan-hydrochlorothiazide (DIOVAN-HCT) 320-25 MG per tablet Take 1 tablet by mouth daily       No current facility-administered medications for this visit.     [unfilled]    SOCIAL HISTORY:   reports that she quit smoking about 39 years ago. Her smoking use included cigarettes. She started smoking about 49 years ago. She has a 2.5 pack-year smoking history. She has never used smokeless tobacco. She reports current alcohol use of about 2.0 standard drinks of alcohol per week. She reports that she does not use drugs.     FAMILY HISTORY:  family history includes Breast cancer in her maternal aunt and maternal grandmother; Diabetes in her father; Diabetes type II in her brother, father, paternal uncle, and sister; Hypertension in her brother and sister; Melanoma in her father; Myocarditis in her mother; No Known Problems in her daughter, daughter, maternal aunt, maternal grandfather, paternal aunt, paternal grandfather, and paternal grandmother; Stroke in her father.     ALLERGIES:  is allergic to jardiance  "[empagliflozin] and iodinated contrast media.      Physical Exam:  Vital Signs:   Visit Vitals  /74 (BP Location: Left arm, Patient Position: Sitting, Cuff Size: Standard)   Pulse 89   Temp 97.9 °F (36.6 °C) (Temporal)   Resp 16   Ht 5' 6\" (1.676 m)   Wt 80.3 kg (177 lb)   LMP  (LMP Unknown)   SpO2 99%   BMI 28.57 kg/m²   OB Status Postmenopausal   Smoking Status Former   BSA 1.9 m²     Body mass index is 28.57 kg/m².  Body surface area is 1.9 meters squared.    Physical Exam  Constitutional:       General: She is not in acute distress.     Appearance: Normal appearance.   HENT:      Head: Normocephalic and atraumatic.   Eyes:      General: No scleral icterus.        Right eye: No discharge.         Left eye: No discharge.      Conjunctiva/sclera: Conjunctivae normal.   Cardiovascular:      Rate and Rhythm: Normal rate and regular rhythm.   Pulmonary:      Effort: Pulmonary effort is normal. No respiratory distress.      Breath sounds: Normal breath sounds.   Abdominal:      General: Bowel sounds are normal. There is no distension.      Palpations: Abdomen is soft. There is no mass.      Tenderness: There is no abdominal tenderness.   Musculoskeletal:         General: Normal range of motion.   Lymphadenopathy:      Cervical: No cervical adenopathy.      Upper Body:      Right upper body: No supraclavicular, axillary or pectoral adenopathy.      Left upper body: No supraclavicular, axillary or pectoral adenopathy.   Skin:     General: Skin is warm and dry.   Neurological:      General: No focal deficit present.      Mental Status: She is alert and oriented to person, place, and time.   Psychiatric:         Mood and Affect: Mood normal.         Behavior: Behavior normal.         Labs:  Lab Results   Component Value Date    WBC 6.1 11/08/2023    HGB 11.1 (L) 11/08/2023    HCT 34.0 (L) 11/08/2023    MCV 88.3 11/08/2023     11/08/2023     Lab Results   Component Value Date    SODIUM 137 06/24/2024    K 4.4 " 06/24/2024     06/24/2024    CO2 27 06/24/2024    BUN 24 06/24/2024    CREATININE 0.68 06/24/2024    GLUC 128 (H) 06/24/2024    CALCIUM 9.7 06/24/2024    AST 15 06/24/2024    ALT 16 06/24/2024    ALKPHOS 29 (L) 06/24/2024    TP 6.6 06/24/2024    TBILI 0.4 06/24/2024    EGFR 95 06/24/2024

## 2024-09-06 NOTE — TELEPHONE ENCOUNTER
Called [facility] to obtain patient's lab results prior to her follow up appointment with TAYLOR Guzman on Monday 9/9.    [facility] will be faxing over results today 9/6 to the [facility].

## 2024-09-09 ENCOUNTER — OFFICE VISIT (OUTPATIENT)
Age: 69
End: 2024-09-09
Payer: COMMERCIAL

## 2024-09-09 ENCOUNTER — TELEPHONE (OUTPATIENT)
Age: 69
End: 2024-09-09

## 2024-09-09 VITALS
HEART RATE: 89 BPM | SYSTOLIC BLOOD PRESSURE: 134 MMHG | RESPIRATION RATE: 16 BRPM | WEIGHT: 177 LBS | DIASTOLIC BLOOD PRESSURE: 74 MMHG | OXYGEN SATURATION: 99 % | HEIGHT: 66 IN | TEMPERATURE: 97.9 F | BODY MASS INDEX: 28.45 KG/M2

## 2024-09-09 DIAGNOSIS — D50.9 IRON DEFICIENCY ANEMIA, UNSPECIFIED IRON DEFICIENCY ANEMIA TYPE: ICD-10-CM

## 2024-09-09 DIAGNOSIS — E53.8 B12 DEFICIENCY: Primary | ICD-10-CM

## 2024-09-09 PROCEDURE — 99213 OFFICE O/P EST LOW 20 MIN: CPT | Performed by: NURSE PRACTITIONER

## 2024-09-09 RX ORDER — CYANOCOBALAMIN 1000 UG/ML
1000 INJECTION, SOLUTION INTRAMUSCULAR; SUBCUTANEOUS ONCE
Status: CANCELLED | OUTPATIENT
Start: 2024-09-13

## 2024-09-13 ENCOUNTER — HOSPITAL ENCOUNTER (OUTPATIENT)
Dept: INFUSION CENTER | Facility: HOSPITAL | Age: 69
End: 2024-09-13
Attending: INTERNAL MEDICINE
Payer: COMMERCIAL

## 2024-09-13 VITALS
TEMPERATURE: 97.4 F | HEART RATE: 78 BPM | SYSTOLIC BLOOD PRESSURE: 127 MMHG | RESPIRATION RATE: 16 BRPM | DIASTOLIC BLOOD PRESSURE: 71 MMHG

## 2024-09-13 DIAGNOSIS — D50.9 IRON DEFICIENCY ANEMIA, UNSPECIFIED IRON DEFICIENCY ANEMIA TYPE: ICD-10-CM

## 2024-09-13 DIAGNOSIS — E53.8 B12 DEFICIENCY: Primary | ICD-10-CM

## 2024-09-13 PROCEDURE — 96372 THER/PROPH/DIAG INJ SC/IM: CPT

## 2024-09-13 RX ORDER — CYANOCOBALAMIN 1000 UG/ML
1000 INJECTION, SOLUTION INTRAMUSCULAR; SUBCUTANEOUS ONCE
OUTPATIENT
Start: 2024-10-11

## 2024-09-13 RX ORDER — CYANOCOBALAMIN 1000 UG/ML
1000 INJECTION, SOLUTION INTRAMUSCULAR; SUBCUTANEOUS ONCE
Status: COMPLETED | OUTPATIENT
Start: 2024-09-13 | End: 2024-09-13

## 2024-09-13 RX ADMIN — CYANOCOBALAMIN 1000 MCG: 1000 INJECTION, SOLUTION INTRAMUSCULAR at 13:49

## 2024-10-02 ENCOUNTER — TELEPHONE (OUTPATIENT)
Age: 69
End: 2024-10-02

## 2024-10-02 ENCOUNTER — OFFICE VISIT (OUTPATIENT)
Age: 69
End: 2024-10-02
Payer: COMMERCIAL

## 2024-10-02 VITALS
WEIGHT: 175.4 LBS | HEART RATE: 76 BPM | SYSTOLIC BLOOD PRESSURE: 124 MMHG | DIASTOLIC BLOOD PRESSURE: 60 MMHG | BODY MASS INDEX: 28.19 KG/M2 | HEIGHT: 66 IN | OXYGEN SATURATION: 96 %

## 2024-10-02 DIAGNOSIS — G47.33 OSA (OBSTRUCTIVE SLEEP APNEA): Primary | ICD-10-CM

## 2024-10-02 PROCEDURE — 99213 OFFICE O/P EST LOW 20 MIN: CPT | Performed by: INTERNAL MEDICINE

## 2024-10-02 PROCEDURE — G2211 COMPLEX E/M VISIT ADD ON: HCPCS | Performed by: INTERNAL MEDICINE

## 2024-10-02 NOTE — ASSESSMENT & PLAN NOTE
September 2023 PSG AHI 57.9 was in the supine position and REM.  Only 6.7 minutes with SpO2 below 90%.  Associated with snoring, dry mouth.  She does not have excessive daytime sleepiness.  She is treating with CPAP mainly for cardiovascular and endocrine benefit    Fully compliant with CPAP, doing much better and more acclimated to CPAP  Having some rainout in her tubing.  She does not use heated tubing    Compliance data  9/23/2024  97% use, 93% more than 4 hours  Average use 5 hours and 27 minutes  AirSense 10 4-12 cm H2O EPR 2  Median pressure 9.6, 95 percentile 11.6  95th percentile leak 16.2 L/min  Residual AHI 1.2    Decrease humidification levels to 4 from 6 to reduce rainout.  If that does not work she can call [facility] to get heated tubing  Decrease pressures to 4-10 cm H2O  Follow-up in 1 year

## 2024-10-02 NOTE — TELEPHONE ENCOUNTER
Order for Cpap supplies was placed through [facility] for [facility].

## 2024-10-02 NOTE — PROGRESS NOTES
Sleep Medicine Outpatient Follow Up Note   Sarahi Gutierrez 68 y.o. adult MRN: [id number]  10/2/2024      Reason for Consultation:    Chief Complaint   Patient presents with    Sleep Apnea     Assessment/Plan:    1. KERRY (obstructive sleep apnea)  Assessment & Plan:  September 2023 PSG AHI 57.9 was in the supine position and REM.  Only 6.7 minutes with SpO2 below 90%.  Associated with snoring, dry mouth.  She does not have excessive daytime sleepiness.  She is treating with CPAP mainly for cardiovascular and endocrine benefit    Fully compliant with CPAP, doing much better and more acclimated to CPAP  Having some rainout in her tubing.  She does not use heated tubing    Compliance data  9/23/2024  97% use, 93% more than 4 hours  Average use 5 hours and 27 minutes  AirSense 10 4-12 cm H2O EPR 2  Median pressure 9.6, 95 percentile 11.6  95th percentile leak 16.2 L/min  Residual AHI 1.2    Decrease humidification levels to 4 from 6 to reduce rainout.  If that does not work she can call [facility] to get heated tubing  Decrease pressures to 4-10 cm H2O  Follow-up in 1 year  Orders:  -     PAP DME Resupply/Reorder      Health Maintenance  Immunization History   Administered Date(s) Administered    COVID-19 PFIZER VACCINE 0.3 ML IM 02/27/2021, 03/20/2021, 11/08/2021    COVID-19 Pfizer Vac BIVALENT Timothy-sucrose 12 Yr+ IM 10/16/2022    COVID-19 Pfizer vac (Timothy-sucrose, gray cap) 12 yr+ IM 04/20/2022    H1N1, All Formulations 01/14/2010    INFLUENZA 11/27/2015, 09/11/2017, 10/30/2018, 09/17/2020, 10/04/2021, 10/16/2022    Influenza Split High Dose Preservative Free IM 10/18/2023    Influenza, injectable, quadrivalent, preservative free 0.5 mL 09/17/2020    Pneumococcal Conjugate 13-Valent 01/05/2021    Zoster 07/31/2016    Zoster Vaccine Recombinant 04/30/2019        Return in about 1 year (around 10/2/2025).    History of Present Illness   HPI:  Sarahi Gutierrez is a 68 y.o. adult who has a past medical history of diabetes  mellitus, diabetic neuropathy, hypertension, hyperlipidemia, chronic UTI, who is presenting for the follow up of severe obstructive sleep apnea     10/2/24 - FU with me -she is doing reasonably well with CPAP.  During the summertime she usually gets congestion and cannot use CPAP.  She does report some rainout in the tubing which makes a gurgling sound at night which interrupts CPAP use.  Her humidification set at level 6.  She does not use heated tubing.  Otherwise pretty compliant with CPAP.  She is getting iron infusions and vitamin B12 supplements due to deficiencies likely contributed by metformin.  She is seeing hematology.  With iron infusions and vitamin B12 injections she is having more energy and is able to do more things around her home.      1/9/24 - FU with me - PSG in September 2023 that shows AHI 57.9 worse in the supine position and REM.  Amount of time below SPO2 90% was only 6.7 minutes.  Mostly supine sleep during the night.     She has been on CPAP for 3 months.  She started with a full face mask and switched to a nasal pillow right before Holidays.  Too much air leak with full face mask.  At one point she woke up feeling like she was suffocating.      DME is [facility].  She set up a mask fitting appointment and now using nasal cushions with occasional discomfort.  Last night she was on it for 8 hours.      Her  notes that snoring is decreased with CPAP.      She still has mask leaks with nasal pillows and the pillows do irritate her nose at times.  Frustration is decreasing with nasal masks.      Last caffeine drink is at 3pm now.      She takes flonase/antihistamine.  Still taking a benadryl every night.      She is getting into sleep a little bit faster now.  She is seeing a gradual increase in usage.      She has a [employer] CGM in her right arm.      Using PAP every night/day: yes  Using PAP the entire duration of sleep: improving  Benefiting from PAP: mild occasional  benefit    [place]: 2    Historical Information   Past Medical History:   Diagnosis Date    Castelan's esophagus 2023    Bicornate uterus     Diabetes mellitus (HCC)     Diagnosed by family provider    GERD (gastroesophageal reflux disease)     Hyperlipidemia     Hypertension     Macular degeneration of left eye     Sleep apnea     Sleep apnea, obstructive 2013    Probably had it earlier in my life     Past Surgical History:   Procedure Laterality Date     SECTION      X 2    CHOLECYSTECTOMY      lap    TUBAL LIGATION Bilateral      Family History   Problem Relation Age of Onset    Myocarditis Mother     Melanoma Father     Diabetes type II Father             Stroke Father     Diabetes Father          at 85 yrs. - managed disease w/ metformin    Diabetes type II Sister         Recently diagnosed    Hypertension Sister         Managed with meds    No Known Problems Daughter     No Known Problems Daughter     Breast cancer Maternal Grandmother          at an early age    No Known Problems Maternal Grandfather     No Known Problems Paternal Grandmother     No Known Problems Paternal Grandfather     Hypertension Brother         Managed with meds    Diabetes type II Brother         Recently Diagnosed    Breast cancer Maternal Aunt         unknown age of onset    No Known Problems Maternal Aunt     No Known Problems Paternal Aunt     Diabetes type II Paternal Uncle                  Meds/Allergies     Current Outpatient Medications:     atorvastatin (LIPITOR) 40 mg tablet, Take 40 mg by mouth in the morning  , Disp: , Rfl:     Continuous Blood Gluc  (FreeStyle Sweta 14 Day [place]) ALYSHA, As directed, Disp: , Rfl:     doxycycline (VIBRAMYCIN), Take 2 immediately at discovery of attached tick Oral 2 at once-single dose for non pregnant, non lactating adults, Disp: , Rfl:     fenofibrate (TRICOR) 54 MG tablet, take 1 tablet by mouth once daily with meals, Disp: , Rfl:     " ferrous sulfate 325 (65 Fe) mg tablet, every 24 hours, Disp: , Rfl:     fluticasone (Flonase Allergy Relief) 50 mcg/act nasal spray, 2 sprays every 24 hours, Disp: , Rfl:     Janumet -1000 MG TB24, Take 1 tablet by mouth every morning, Disp: 90 tablet, Rfl: 3    Magnesium Cl-Calcium Carbonate (SLOW-MAG PO), Take by mouth in the morning, Disp: , Rfl:     metFORMIN (GLUCOPHAGE-XR) 500 mg 24 hr tablet, Take 2 tablets (1,000 mg total) by mouth daily with dinner, Disp: 180 tablet, Rfl: 3    MISC NATURAL PRODUCTS EX, Apply topically Magnesium cream to legs at night for leg cramps, Disp: , Rfl:     Multiple Vitamins-Minerals (MULTIVITAMIN ADULTS 50+ PO), Orally, Disp: , Rfl:     Multiple Vitamins-Minerals (PRESERVISION AREDS PO), Take by mouth in the morning, Disp: , Rfl:     nitrofurantoin (MACRODANTIN) 50 mg capsule, prn, Disp: , Rfl:     omeprazole (PriLOSEC) 20 mg delayed release capsule, Take 40 mg by mouth daily, Disp: , Rfl:     pioglitazone (ACTOS) 45 mg tablet, Take 1 tablet (45 mg total) by mouth daily, Disp: 90 tablet, Rfl: 0    valsartan-hydrochlorothiazide (DIOVAN-HCT) 320-25 MG per tablet, Take 1 tablet by mouth daily, Disp: , Rfl:   Allergies   Allergen Reactions    Jardiance [Empagliflozin] Other (See Comments)     Frequent yeast infections    Iodinated Contrast Media Rash       Vitals: Blood pressure 124/60, pulse 76, height 5' 6\" (1.676 m), weight 79.6 kg (175 lb 6.4 oz), SpO2 96%. Body mass index is 28.31 kg/m². Oxygen Therapy  SpO2: 96 %    Physical Exam  Vitals and nursing note reviewed.   Constitutional:       General: She is not in acute distress.     Appearance: She is well-developed. She is not ill-appearing, toxic-appearing or diaphoretic.   HENT:      Head: Normocephalic and atraumatic.   Eyes:      General: No scleral icterus.     Extraocular Movements: Extraocular movements intact.      Conjunctiva/sclera: Conjunctivae normal.   Pulmonary:      Effort: Pulmonary effort is normal. No " "respiratory distress.      Breath sounds: No stridor.   Abdominal:      Tenderness: There is no guarding.   Musculoskeletal:      Cervical back: Normal range of motion. No rigidity.   Skin:     General: Skin is warm and dry.      Coloration: Skin is not jaundiced.   Neurological:      Mental Status: She is alert. Mental status is at baseline.   Psychiatric:         Mood and Affect: Mood normal.             Labs:   I have personally reviewed pertinent lab results.    ABG: No results found for: \"PHART\", \"XCX8DMZ\", \"PO2ART\", \"AFE5WXS\", \"Z9UNTHBA\", \"BEART\", \"SOURCE\",   BNP: No results found for: \"BNP\",   CBC:  Lab Results   Component Value Date    WBC 6.1 11/08/2023    HGB 11.1 (L) 11/08/2023    HCT 34.0 (L) 11/08/2023    MCV 88.3 11/08/2023     11/08/2023    EOSPCT 4.4 11/08/2023    EOSABS 268 11/08/2023    NEUTOPHILPCT 59.7 11/08/2023    LYMPHOPCT 25.2 11/08/2023   ,   CMP:   Lab Results   Component Value Date    SODIUM 137 06/24/2024    K 4.4 06/24/2024     06/24/2024    CO2 27 06/24/2024    BUN 24 06/24/2024    CREATININE 0.68 06/24/2024    CALCIUM 9.7 06/24/2024    AST 15 06/24/2024    ALT 16 06/24/2024    ALKPHOS 29 (L) 06/24/2024    EGFR 95 06/24/2024   ,   PT/INR: No results found for: \"PT\", \"INR\",   Ferrtin: No components found for: \"FERRTIN\",  Magensium: No results found for: \"MAGNESIUM\",      Imaging and other studies: I have personally reviewed pertinent reports.   and I have personally reviewed pertinent films in PACS      Sleep Study:  9/18/2023  SLEEP:  Patient slept for 218.5 minutes out of 415.7 minutes in bed representing a sleep efficiency of 52.6%.  Sleep architecture showed a sleep latency of 68.5 minutes and a REM sleep latency of 188.0 minutes.  There was 27.9% Stage N1 noted.  There was 58.8% Stage N2 noted.  There was 0.0% Stage N3 noted. There was 13.3% REM sleep noted. The patient had 179 arousals for an average of 49.2 arousals per hour of sleep.     RESPIRATORY:  There were a " "total of 211 respiratory events made up of 40 obstructive apneas, 0 mixed apneas, 3 central apneas, and 168 hypopneas resulting in an apnea/hypopnea index (AHI) of 57.9 events per hour of sleep.  The AHI in the supine position was 73.0.  The AHI during REM sleep was 89.0.  Loud intensity snoring was noted.  There were also 30 respiratory effort related arousals resulting in an RDI of 66.2/h.     OXIMETRY:  The baseline oxygen saturation with the patient awake was 95.6%.  The mean oxygen saturation throughout the study was 94.2%.  The amount of sleep time below 90% was 6.7 minutes.  The lowest oxygen saturation was 77.0%.     BODY POSITION:  The patient slept 83.5% of the evening in the supine position, 16.5% on left side, 0.0% on right side, and 0.0%  in the prone position.      ADDITIONAL DATA:   EMG, EEG & ECG were unremarkable      Compliance data:  9/23/2024  97% use, 93% more than 4 hours  Average use 5 hours and 27 minutes  AirSense 10 4-12 cm H2O EPR 2  Median pressure 9.6, 95 percentile 11.6  95th percentile leak 16.2 L/min  Residual AHI 1.2    1/7/2024  DME [facility]  93% use more than 4 hours  Average use 5 hours and 12 minutes  AirSense 10 4-20 cm H2O; EPR 2  Median pressure 9.5, 95th percentile 12.3  Median leak 4.9, 95th percentile 28.7  Residual AHI 1.8    Sam Muse MD  Pulmonary, Critical Care and Sleep Medicine  [facility]     Portions of the record may have been created with voice recognition software. Occasional wrong word or \"sound a like\" substitutions may have occurred due to the inherent limitations of voice recognition software. Please read the chart carefully and recognize, using context, where substitutions have occurred.     "

## 2024-10-03 LAB

## 2024-10-11 ENCOUNTER — HOSPITAL ENCOUNTER (OUTPATIENT)
Dept: INFUSION CENTER | Facility: HOSPITAL | Age: 69
End: 2024-10-11
Attending: INTERNAL MEDICINE
Payer: COMMERCIAL

## 2024-10-11 DIAGNOSIS — E53.8 B12 DEFICIENCY: ICD-10-CM

## 2024-10-11 DIAGNOSIS — D50.9 IRON DEFICIENCY ANEMIA, UNSPECIFIED IRON DEFICIENCY ANEMIA TYPE: Primary | ICD-10-CM

## 2024-10-11 PROCEDURE — 96372 THER/PROPH/DIAG INJ SC/IM: CPT

## 2024-10-11 RX ORDER — CYANOCOBALAMIN 1000 UG/ML
1000 INJECTION, SOLUTION INTRAMUSCULAR; SUBCUTANEOUS ONCE
OUTPATIENT
Start: 2024-11-08

## 2024-10-11 RX ORDER — CYANOCOBALAMIN 1000 UG/ML
1000 INJECTION, SOLUTION INTRAMUSCULAR; SUBCUTANEOUS ONCE
Status: COMPLETED | OUTPATIENT
Start: 2024-10-11 | End: 2024-10-11

## 2024-10-11 RX ADMIN — CYANOCOBALAMIN 1000 MCG: 1000 INJECTION, SOLUTION INTRAMUSCULAR at 11:36

## 2024-10-11 NOTE — PROGRESS NOTES
Sarahi Gutierrez  tolerated treatment well with no complications.      Sarahi Gutierrez is aware of future appt on 11/8 at 0900.     AVS printed and given to Sarahi Gutierrez:  No (Declined by Sarahi Gutierrez)

## 2024-10-31 ENCOUNTER — HOSPITAL ENCOUNTER (OUTPATIENT)
Dept: NON INVASIVE DIAGNOSTICS | Facility: HOSPITAL | Age: 69
Discharge: HOME | End: 2024-10-31
Payer: COMMERCIAL

## 2024-10-31 DIAGNOSIS — E78.2 MIXED HYPERLIPIDEMIA: ICD-10-CM

## 2024-10-31 PROCEDURE — 93922 UPR/L XTREMITY ART 2 LEVELS: CPT | Performed by: SURGERY

## 2024-10-31 PROCEDURE — 93978 VASCULAR STUDY: CPT | Performed by: SURGERY

## 2024-10-31 PROCEDURE — 93880 EXTRACRANIAL BILAT STUDY: CPT | Performed by: SURGERY

## 2024-10-31 PROCEDURE — 93922 UPR/L XTREMITY ART 2 LEVELS: CPT

## 2024-11-05 LAB
ALBUMIN SERPL-MCNC: 4.5 G/DL (ref 3.6–5.1)
ALBUMIN/GLOB SERPL: 1.8 (CALC) (ref 1–2.5)
ALP SERPL-CCNC: 31 U/L (ref 37–153)
ALT SERPL-CCNC: 17 U/L (ref 6–29)
AST SERPL-CCNC: 14 U/L (ref 10–35)
BILIRUB SERPL-MCNC: 0.3 MG/DL (ref 0.2–1.2)
BUN SERPL-MCNC: 22 MG/DL (ref 7–25)
BUN/CREAT SERPL: ABNORMAL (CALC) (ref 6–22)
CALCIUM SERPL-MCNC: 9.9 MG/DL (ref 8.6–10.4)
CHLORIDE SERPL-SCNC: 102 MMOL/L (ref 98–110)
CO2 SERPL-SCNC: 28 MMOL/L (ref 20–32)
CREAT SERPL-MCNC: 0.68 MG/DL (ref 0.5–1.05)
GFR/BSA.PRED SERPLBLD CYS-BASED-ARV: 95 ML/MIN/1.73M2
GLOBULIN SER CALC-MCNC: 2.5 G/DL (CALC) (ref 1.9–3.7)
GLUCOSE SERPL-MCNC: 130 MG/DL (ref 65–99)
HBA1C MFR BLD: 7.1 % OF TOTAL HGB
POTASSIUM SERPL-SCNC: 4.5 MMOL/L (ref 3.5–5.3)
PROT SERPL-MCNC: 7 G/DL (ref 6.1–8.1)
SODIUM SERPL-SCNC: 139 MMOL/L (ref 135–146)

## 2024-11-06 ENCOUNTER — OFFICE VISIT (OUTPATIENT)
Dept: ENDOCRINOLOGY | Facility: HOSPITAL | Age: 69
End: 2024-11-06
Payer: COMMERCIAL

## 2024-11-06 VITALS
WEIGHT: 180.4 LBS | SYSTOLIC BLOOD PRESSURE: 132 MMHG | DIASTOLIC BLOOD PRESSURE: 80 MMHG | HEIGHT: 66 IN | BODY MASS INDEX: 28.99 KG/M2 | HEART RATE: 68 BPM

## 2024-11-06 DIAGNOSIS — I10 PRIMARY HYPERTENSION: ICD-10-CM

## 2024-11-06 DIAGNOSIS — E11.65 TYPE 2 DIABETES MELLITUS WITH HYPERGLYCEMIA, WITHOUT LONG-TERM CURRENT USE OF INSULIN (HCC): Primary | ICD-10-CM

## 2024-11-06 DIAGNOSIS — E78.2 MIXED HYPERLIPIDEMIA: ICD-10-CM

## 2024-11-06 DIAGNOSIS — E11.42 DIABETIC POLYNEUROPATHY ASSOCIATED WITH TYPE 2 DIABETES MELLITUS (HCC): ICD-10-CM

## 2024-11-06 PROCEDURE — 99214 OFFICE O/P EST MOD 30 MIN: CPT | Performed by: INTERNAL MEDICINE

## 2024-11-06 NOTE — PROGRESS NOTES
11/6/2024    Assessment & Plan      Diagnoses and all orders for this visit:    Type 2 diabetes mellitus with hyperglycemia, without long-term current use of insulin (HCC)  -     Comprehensive metabolic panel; Future  -     Hemoglobin A1C; Future  -     TSH, 3rd generation; Future  -     Lipid Panel with Direct LDL reflex; Future  -     Comprehensive metabolic panel  -     TSH, 3rd generation  -     Lipid Panel with Direct LDL reflex    Diabetic polyneuropathy associated with type 2 diabetes mellitus (HCC)  -     Comprehensive metabolic panel; Future  -     Hemoglobin A1C; Future  -     TSH, 3rd generation; Future  -     Lipid Panel with Direct LDL reflex; Future  -     Comprehensive metabolic panel  -     TSH, 3rd generation  -     Lipid Panel with Direct LDL reflex    Primary hypertension  -     Comprehensive metabolic panel; Future  -     Hemoglobin A1C; Future  -     TSH, 3rd generation; Future  -     Lipid Panel with Direct LDL reflex; Future  -     Comprehensive metabolic panel  -     TSH, 3rd generation  -     Lipid Panel with Direct LDL reflex    Mixed hyperlipidemia  -     Comprehensive metabolic panel; Future  -     Hemoglobin A1C; Future  -     TSH, 3rd generation; Future  -     Lipid Panel with Direct LDL reflex; Future  -     Comprehensive metabolic panel  -     TSH, 3rd generation  -     Lipid Panel with Direct LDL reflex          Assessment & Plan  1. Type 2 Diabetes Mellitus.  Most recent hemoglobin A1c is 7.1%. This is improved since the last visit, although ideally, it should be less than 7. She has been eating quite late in the day due to her 's schedule, which may contribute to her higher morning blood sugars combined with her insulin resistance. She will continue the same dosages of Janumet 100/1000 mg XR in the morning, metformin  mg two with dinner, and pioglitazone 45 mg daily. She will work on dietary changes and exercise to try to lose weight. She will try to eat earlier in the  day and perhaps take a small bedtime snack of just protein to see if that improves her morning blood sugars. She will continue to test her blood sugars up to once a day.    2. Diabetic Neuropathy.  She reports occasional numbness in her hands and feet, particularly when sitting for long periods or driving. She denies any wounds on her feet. A diabetic foot exam was last performed in March 2024, so she is up to date with foot exams. She will continue to monitor for any new symptoms.    3. Hypertension.  She is normotensive in the office today. She will continue the same dose of valsartan/HCTZ 320/25 mg daily for blood pressure and kidney protection.    4. Hyperlipidemia.  She will continue the same atorvastatin 40 mg daily and fenofibrate 54 mg once a day. A lipid profile will be repeated at her next visit.      Follow-up  Return in 3 to 4 months with preceding hemoglobin A1c, CMP, TSH, and lipid panel.        CC: Diabetes 2, blood pressure, lipid follow-up      History of Present Illness    HPI: Sarahi Gutierrez is a 68-year-old female with a history of type 2 diabetes with neuropathy diagnosed 20 years ago, hypertension, and hyperlipidemia, here for a follow-up visit.    She reports frequent urination, particularly at night, and increased thirst and hunger.     She experiences occasional numbness in her hands and feet, particularly when sitting for extended periods. She also reports an unusual sensation in her right foot while driving. She is not currently seeing a podiatrist. She denies any abdominal pain, nausea, blurry vision, foot wounds, chest pain, or shortness of breath.    She denies blurry vision.  She has recently had an eye exam within the last few months.    She has been receiving B12 injections at the [facility], which have improved her energy levels.    She is currently taking atorvastatin 40 mg daily and fenofibrate 54 mg once daily for cholesterol management.    She is on valsartan HCTZ 320/25  mg daily for blood pressure control and kidney protection.    She is taking omeprazole for Castelan's esophagus and is scheduled for an endoscopic procedure before the end of the year. She is also under the care of a pulmonologist and uses a CPAP machine for sleep apnea.    Blood Sugar/Glucometer/Pump/CGM review: She has been experiencing high fasting blood sugar levels. She checks her blood sugar levels every other day, with morning readings around 171 and afternoon readings between 100 and 115.      Historical Information   Past Medical History:   Diagnosis Date    Castelan's esophagus 2023    Bicornate uterus     Diabetes mellitus (HCC)     Diagnosed by family provider    GERD (gastroesophageal reflux disease)     Hyperlipidemia     Hypertension     Macular degeneration of left eye     Sleep apnea     Sleep apnea, obstructive 2013    Probably had it earlier in my life     Past Surgical History:   Procedure Laterality Date     SECTION      X 2    CHOLECYSTECTOMY      lap    TUBAL LIGATION Bilateral      Social History   Social History     Substance and Sexual Activity   Alcohol Use Yes    Alcohol/week: 2.0 standard drinks of alcohol    Types: 1 Glasses of wine, 1 Cans of beer per week    Comment: Reduced consumption social     Social History     Substance and Sexual Activity   Drug Use Never     Social History     Tobacco Use   Smoking Status Former    Current packs/day: 0.00    Average packs/day: 0.3 packs/day for 10.0 years (2.5 ttl pk-yrs)    Types: Cigarettes    Start date: 1975    Quit date: 1985    Years since quittin.8   Smokeless Tobacco Never   Tobacco Comments    Social habit, late teens through late 20s     Family History:   Family History   Problem Relation Age of Onset    Myocarditis Mother     Melanoma Father     Diabetes type II Father             Stroke Father     Diabetes Father          at 85 yrs. - managed disease w/ metformin    Diabetes type II  Sister         Recently diagnosed    Hypertension Sister         Managed with meds    No Known Problems Daughter     No Known Problems Daughter     Breast cancer Maternal Grandmother          at an early age    No Known Problems Maternal Grandfather     No Known Problems Paternal Grandmother     No Known Problems Paternal Grandfather     Hypertension Brother         Managed with meds    Diabetes type II Brother         Recently Diagnosed    Breast cancer Maternal Aunt         unknown age of onset    No Known Problems Maternal Aunt     No Known Problems Paternal Aunt     Diabetes type II Paternal Uncle                Meds/Allergies   Current Outpatient Medications   Medication Sig Dispense Refill    atorvastatin (LIPITOR) 40 mg tablet Take 40 mg by mouth in the morning        Continuous Blood Gluc  (FreeStyle Sweta 14 Day [place]) ALYSHA As directed      doxycycline (VIBRAMYCIN) Take 2 immediately at discovery of attached tick Oral 2 at once-single dose for non pregnant, non lactating adults      fenofibrate (TRICOR) 54 MG tablet take 1 tablet by mouth once daily with meals      ferrous sulfate 325 (65 Fe) mg tablet every 24 hours      fluticasone (Flonase Allergy Relief) 50 mcg/act nasal spray 2 sprays every 24 hours      Janumet -1000 MG TB24 Take 1 tablet by mouth every morning 90 tablet 3    Magnesium Cl-Calcium Carbonate (SLOW-MAG PO) Take by mouth in the morning      metFORMIN (GLUCOPHAGE-XR) 500 mg 24 hr tablet Take 2 tablets (1,000 mg total) by mouth daily with dinner 180 tablet 3    MISC NATURAL PRODUCTS EX Apply topically Magnesium cream to legs at night for leg cramps      Multiple Vitamins-Minerals (MULTIVITAMIN ADULTS 50+ PO) Orally      Multiple Vitamins-Minerals (PRESERVISION AREDS PO) Take by mouth in the morning      nitrofurantoin (MACRODANTIN) 50 mg capsule prn      omeprazole (PriLOSEC) 20 mg delayed release capsule Take 40 mg by mouth daily      pioglitazone (ACTOS) 45 mg  "tablet Take 1 tablet (45 mg total) by mouth daily 90 tablet 0    valsartan-hydrochlorothiazide (DIOVAN-HCT) 320-25 MG per tablet Take 1 tablet by mouth daily       No current facility-administered medications for this visit.     Allergies   Allergen Reactions    Jardiance [Empagliflozin] Other (See Comments)     Frequent yeast infections    Iodinated Contrast Media Rash       Objective   Vitals: Blood pressure 132/80, pulse 68, height 5' 6\" (1.676 m), weight 81.8 kg (180 lb 6.4 oz).  Invasive Devices       None                   Physical Exam    Thyroid is normal in size. No palpable thyroid nodules.  Lungs are clear to auscultation.  Heart has a regular rate and rhythm. No murmurs.  No lower extremity edema in the musculoskeletal system.      The history was obtained from the review of the chart and from the patient.    Lab Results:    Most recent Alc is  Lab Results   Component Value Date    HGBA1C 7.1 (H) 11/04/2024           Blood work performed on 11/4/2024 showed a CMP with a glucose of 130 fasting, but was otherwise unremarkable.    Lab Results   Component Value Date    CREATININE 0.68 11/04/2024    CREATININE 0.68 06/24/2024    CREATININE 0.59 11/08/2023    BUN 22 11/04/2024    K 4.5 11/04/2024     11/04/2024    CO2 28 11/04/2024     eGFR   Date Value Ref Range Status   11/04/2024 95 > OR = 60 mL/min/1.73m2 Final         Lab Results   Component Value Date    HDL 67 11/08/2023    TRIG 110 11/08/2023       Lab Results   Component Value Date    ALT 17 11/04/2024    AST 14 11/04/2024    ALKPHOS 31 (L) 11/04/2024       Lab Results   Component Value Date    TSH 3.18 07/11/2023             Future Appointments   Date Time Provider Department Center   11/8/2024  9:00 AM [facility] INF QUICK CHAIR [facility]   12/6/2024 11:30 AM [facility]B INF QUICK CHAIR [facility]   12/13/2024 10:00 AM [facility] MAMMO UP 1 [facility] UP Mammo [facility] UPPER PER   1/3/2025 11:00 AM UB INF QUICK CHAIR [facility] INF[facility]   3/12/2025  " 9:30 AM TAYLOR Mohamud HEM ONC [facility]   3/18/2025 11:40 AM Elisa Madera MD ENDO [facility]

## 2024-11-06 NOTE — PATIENT INSTRUCTIONS
Hgba1c is 7.1%. this is improved.     Continue the same janumet XR, metformin XR, and pioglitazone.     Work on eating earlier in the day. You can try a night time protein snack to see if am sugars lower.     Continue to check blood sugars up to once daily.     Continue to work on diet and exercise and weight loss.     Follow up in 3-4 months with blood work.

## 2024-11-08 ENCOUNTER — HOSPITAL ENCOUNTER (OUTPATIENT)
Dept: INFUSION CENTER | Facility: HOSPITAL | Age: 69
End: 2024-11-08
Attending: INTERNAL MEDICINE
Payer: COMMERCIAL

## 2024-11-08 VITALS
TEMPERATURE: 97.2 F | RESPIRATION RATE: 16 BRPM | HEART RATE: 76 BPM | SYSTOLIC BLOOD PRESSURE: 129 MMHG | OXYGEN SATURATION: 100 % | DIASTOLIC BLOOD PRESSURE: 70 MMHG

## 2024-11-08 DIAGNOSIS — E53.8 B12 DEFICIENCY: ICD-10-CM

## 2024-11-08 DIAGNOSIS — D50.9 IRON DEFICIENCY ANEMIA, UNSPECIFIED IRON DEFICIENCY ANEMIA TYPE: Primary | ICD-10-CM

## 2024-11-08 PROCEDURE — 96372 THER/PROPH/DIAG INJ SC/IM: CPT

## 2024-11-08 RX ORDER — CYANOCOBALAMIN 1000 UG/ML
1000 INJECTION, SOLUTION INTRAMUSCULAR; SUBCUTANEOUS ONCE
OUTPATIENT
Start: 2024-12-06

## 2024-11-08 RX ORDER — CYANOCOBALAMIN 1000 UG/ML
1000 INJECTION, SOLUTION INTRAMUSCULAR; SUBCUTANEOUS ONCE
Status: COMPLETED | OUTPATIENT
Start: 2024-11-08 | End: 2024-11-08

## 2024-11-08 RX ADMIN — CYANOCOBALAMIN 1000 MCG: 1000 INJECTION, SOLUTION INTRAMUSCULAR at 09:15

## 2024-11-08 NOTE — PROGRESS NOTES
Sarahi Gutierrez  tolerated treatment well with no complications.      Sarahi Gutierrez is aware of future appt on 12/6/24 at 1130.     AVS printed and given to Sarahi Gutierrez:  Yes

## 2024-11-29 ENCOUNTER — OFFICE VISIT (OUTPATIENT)
Dept: URGENT CARE | Facility: CLINIC | Age: 69
End: 2024-11-29

## 2024-11-29 VITALS
SYSTOLIC BLOOD PRESSURE: 124 MMHG | OXYGEN SATURATION: 98 % | WEIGHT: 180 LBS | RESPIRATION RATE: 16 BRPM | BODY MASS INDEX: 28.93 KG/M2 | HEART RATE: 88 BPM | HEIGHT: 66 IN | TEMPERATURE: 97.9 F | DIASTOLIC BLOOD PRESSURE: 64 MMHG

## 2024-11-29 DIAGNOSIS — R19.7 NAUSEA VOMITING AND DIARRHEA: Primary | ICD-10-CM

## 2024-11-29 DIAGNOSIS — R11.2 NAUSEA VOMITING AND DIARRHEA: Primary | ICD-10-CM

## 2024-11-29 DIAGNOSIS — R11.0 NAUSEA: ICD-10-CM

## 2024-11-29 RX ORDER — ONDANSETRON 4 MG/1
4 TABLET, FILM COATED ORAL EVERY 8 HOURS PRN
Qty: 10 TABLET | Refills: 0 | Status: SHIPPED | OUTPATIENT
Start: 2024-11-29

## 2024-11-29 NOTE — PROGRESS NOTES
"  [facility]        NAME: Sarahi Gutierrez is a 69 y.o. adult  : 1955    MRN: [id number]  DATE: 2024  TIME: 10:25 PM    Assessment and Plan   Nausea vomiting and diarrhea [R11.2, R19.7]  1. Nausea vomiting and diarrhea        2. Nausea  ondansetron (ZOFRAN) 4 mg tablet            Patient Instructions     Call to schedule follow-up appointment with GI or PCP.    Go to the ED for any severely worsening symptoms.     If tests are performed, our office will contact you with results only if changes need to made to the care plan discussed with you at the visit. You can review your full results on [facility].      Chief Complaint     Chief Complaint   Patient presents with    Vomiting     Pt reports episodes of vomiting and diarrhea with onset two weeks ago with general cold like symptoms. Negative at home Covid test. Denies any fever. Reports f/u with GI. States episodes of vomiting tend to occur at night. States two episodes of lose stool this morning. C/o general nausea. States last episode of vomiting Wednesday morning 02:00 am and 04:00 am.          History of Present Illness       68-year-old female presenting with intermittent nausea, vomiting, and diarrhea over the past two weeks.  Patient states her last episode of vomiting was two days ago, \"projectile.\"  Notes non-bloody non-bilious emesis.  Seems to occur more often at night.  Had two episodes of looser stools this AM, non-bloody.  Today nausea is most bothersome to her.  No solid PO intake today, trying to drink fluids to stay hydrated.   No fevers.  Follows with GI - takes omeprazole for Castelan's esophagus and is scheduled for an endoscopic procedure before the end of the year.        Review of Systems   Review of Systems   Constitutional:  Negative for chills and fever.   Respiratory:  Negative for chest tightness and shortness of breath.    Cardiovascular:  Negative for chest pain and palpitations.   Gastrointestinal:  " Positive for abdominal pain, diarrhea, nausea and vomiting. Negative for blood in stool and constipation.   Genitourinary:  Negative for dysuria and hematuria.   Musculoskeletal:  Negative for back pain and myalgias.   Neurological:  Negative for dizziness and headaches.         Current Medications       Current Outpatient Medications:     atorvastatin (LIPITOR) 40 mg tablet, Take 40 mg by mouth in the morning  , Disp: , Rfl:     doxycycline (VIBRAMYCIN), Take 2 immediately at discovery of attached tick Oral 2 at once-single dose for non pregnant, non lactating adults, Disp: , Rfl:     fenofibrate (TRICOR) 54 MG tablet, take 1 tablet by mouth once daily with meals, Disp: , Rfl:     ferrous sulfate 325 (65 Fe) mg tablet, every 24 hours, Disp: , Rfl:     fluticasone (Flonase Allergy Relief) 50 mcg/act nasal spray, 2 sprays every 24 hours, Disp: , Rfl:     Janumet -1000 MG TB24, Take 1 tablet by mouth every morning, Disp: 90 tablet, Rfl: 3    Magnesium Cl-Calcium Carbonate (SLOW-MAG PO), Take by mouth in the morning, Disp: , Rfl:     metFORMIN (GLUCOPHAGE-XR) 500 mg 24 hr tablet, Take 2 tablets (1,000 mg total) by mouth daily with dinner, Disp: 180 tablet, Rfl: 3    MISC NATURAL PRODUCTS EX, Apply topically Magnesium cream to legs at night for leg cramps, Disp: , Rfl:     Multiple Vitamins-Minerals (MULTIVITAMIN ADULTS 50+ PO), Orally, Disp: , Rfl:     Multiple Vitamins-Minerals (PRESERVISION AREDS PO), Take by mouth in the morning, Disp: , Rfl:     nitrofurantoin (MACRODANTIN) 50 mg capsule, prn, Disp: , Rfl:     omeprazole (PriLOSEC) 20 mg delayed release capsule, Take 40 mg by mouth daily, Disp: , Rfl:     ondansetron (ZOFRAN) 4 mg tablet, Take 1 tablet (4 mg total) by mouth every 8 (eight) hours as needed for nausea or vomiting, Disp: 10 tablet, Rfl: 0    pioglitazone (ACTOS) 45 mg tablet, Take 1 tablet (45 mg total) by mouth daily, Disp: 90 tablet, Rfl: 0    valsartan-hydrochlorothiazide (DIOVAN-HCT) 320-25  MG per tablet, Take 1 tablet by mouth daily, Disp: , Rfl:     Continuous Blood Gluc  (FreeStyle Sweta 14 Day [place]) ALYSHA, As directed (Patient not taking: Reported on 2024), Disp: , Rfl:     Current Allergies     Allergies as of 2024 - Reviewed 2024   Allergen Reaction Noted    Jardiance [empagliflozin] Other (See Comments) 2022    Iodinated contrast media Rash 2016            The following portions of the patient's history were reviewed and updated as appropriate: allergies, current medications, past family history, past medical history, past social history, past surgical history and problem list.     Past Medical History:   Diagnosis Date    Castelan's esophagus 2023    Bicornate uterus     Diabetes mellitus (HCC)     Diagnosed by family provider    GERD (gastroesophageal reflux disease)     Hyperlipidemia     Hypertension     Macular degeneration of left eye     Sleep apnea     Sleep apnea, obstructive 2013    Probably had it earlier in my life       Past Surgical History:   Procedure Laterality Date     SECTION      X 2    CHOLECYSTECTOMY      lap    TUBAL LIGATION Bilateral        Family History   Problem Relation Age of Onset    Myocarditis Mother     Melanoma Father     Diabetes type II Father             Stroke Father     Diabetes Father          at 85 yrs. - managed disease w/ metformin    Diabetes type II Sister         Recently diagnosed    Hypertension Sister         Managed with meds    No Known Problems Daughter     No Known Problems Daughter     Breast cancer Maternal Grandmother          at an early age    No Known Problems Maternal Grandfather     No Known Problems Paternal Grandmother     No Known Problems Paternal Grandfather     Hypertension Brother         Managed with meds    Diabetes type II Brother         Recently Diagnosed    Breast cancer Maternal Aunt         unknown age of onset    No Known Problems Maternal  "Aunt     No Known Problems Paternal Aunt     Diabetes type II Paternal Uncle                  Medications have been verified.        Objective   /64 (BP Location: Left arm, Patient Position: Sitting)   Pulse 88   Temp 97.9 °F (36.6 °C) (Tympanic)   Resp 16   Ht 5' 6\" (1.676 m)   Wt 81.6 kg (180 lb)   LMP  (LMP Unknown)   SpO2 98%   BMI 29.05 kg/m²        Physical Exam     Physical Exam  Vitals and nursing note reviewed.   Constitutional:       General: She is not in acute distress.     Appearance: She is not ill-appearing, toxic-appearing or diaphoretic.   HENT:      Head: Normocephalic and atraumatic.      Mouth/Throat:      Mouth: Mucous membranes are moist.      Pharynx: Oropharynx is clear.   Eyes:      Conjunctiva/sclera: Conjunctivae normal.   Cardiovascular:      Rate and Rhythm: Normal rate and regular rhythm.      Pulses: Normal pulses.      Heart sounds: Normal heart sounds.   Pulmonary:      Effort: Pulmonary effort is normal.      Breath sounds: Normal breath sounds.   Abdominal:      General: Bowel sounds are normal. There is no distension.      Palpations: Abdomen is soft.      Tenderness: There is abdominal tenderness in the epigastric area.   Musculoskeletal:         General: Normal range of motion.      Cervical back: Normal range of motion and neck supple.   Skin:     General: Skin is warm and dry.      Capillary Refill: Capillary refill takes less than 2 seconds.   Neurological:      Mental Status: She is alert and oriented to person, place, and time.                   "

## 2024-12-02 NOTE — PATIENT INSTRUCTIONS
Call to schedule follow-up appointment with GI or PCP.    Go to the ED for any severely worsening symptoms.

## 2024-12-06 ENCOUNTER — HOSPITAL ENCOUNTER (OUTPATIENT)
Dept: INFUSION CENTER | Facility: HOSPITAL | Age: 69
End: 2024-12-06
Attending: INTERNAL MEDICINE
Payer: COMMERCIAL

## 2024-12-06 VITALS
OXYGEN SATURATION: 100 % | RESPIRATION RATE: 16 BRPM | HEART RATE: 77 BPM | TEMPERATURE: 97.1 F | DIASTOLIC BLOOD PRESSURE: 58 MMHG | SYSTOLIC BLOOD PRESSURE: 122 MMHG

## 2024-12-06 DIAGNOSIS — D50.9 IRON DEFICIENCY ANEMIA, UNSPECIFIED IRON DEFICIENCY ANEMIA TYPE: Primary | ICD-10-CM

## 2024-12-06 DIAGNOSIS — E53.8 B12 DEFICIENCY: ICD-10-CM

## 2024-12-06 PROCEDURE — 96372 THER/PROPH/DIAG INJ SC/IM: CPT

## 2024-12-06 RX ORDER — CYANOCOBALAMIN 1000 UG/ML
1000 INJECTION, SOLUTION INTRAMUSCULAR; SUBCUTANEOUS ONCE
Status: COMPLETED | OUTPATIENT
Start: 2024-12-06 | End: 2024-12-06

## 2024-12-06 RX ORDER — CYANOCOBALAMIN 1000 UG/ML
1000 INJECTION, SOLUTION INTRAMUSCULAR; SUBCUTANEOUS ONCE
OUTPATIENT
Start: 2025-01-03

## 2024-12-06 RX ADMIN — CYANOCOBALAMIN 1000 MCG: 1000 INJECTION, SOLUTION INTRAMUSCULAR at 11:38

## 2024-12-06 NOTE — PROGRESS NOTES
..Sarahi Gutierrez  tolerated treatment well with no complications.      Sarahi Gutierrez is aware of future appt on 1/3 at 1100.     AVS printed and given to Sarahi uGtierrez:  No (Declined by Sarahi Gutierrez)

## 2024-12-13 ENCOUNTER — HOSPITAL ENCOUNTER (OUTPATIENT)
Dept: MAMMOGRAPHY | Facility: CLINIC | Age: 69
Discharge: HOME | End: 2024-12-13
Payer: COMMERCIAL

## 2024-12-13 VITALS — BODY MASS INDEX: 28.93 KG/M2 | HEIGHT: 66 IN | WEIGHT: 180 LBS

## 2024-12-13 DIAGNOSIS — Z12.31 ENCOUNTER FOR SCREENING MAMMOGRAM FOR MALIGNANT NEOPLASM OF BREAST: ICD-10-CM

## 2024-12-13 PROCEDURE — 77063 BREAST TOMOSYNTHESIS BI: CPT

## 2024-12-13 PROCEDURE — 77067 SCR MAMMO BI INCL CAD: CPT

## 2025-01-03 ENCOUNTER — HOSPITAL ENCOUNTER (OUTPATIENT)
Dept: INFUSION CENTER | Facility: HOSPITAL | Age: 70
End: 2025-01-03
Attending: INTERNAL MEDICINE
Payer: COMMERCIAL

## 2025-01-03 DIAGNOSIS — D50.9 IRON DEFICIENCY ANEMIA, UNSPECIFIED IRON DEFICIENCY ANEMIA TYPE: Primary | ICD-10-CM

## 2025-01-03 DIAGNOSIS — E53.8 B12 DEFICIENCY: ICD-10-CM

## 2025-01-03 PROCEDURE — 96372 THER/PROPH/DIAG INJ SC/IM: CPT

## 2025-01-03 RX ORDER — CYANOCOBALAMIN 1000 UG/ML
1000 INJECTION, SOLUTION INTRAMUSCULAR; SUBCUTANEOUS ONCE
Status: COMPLETED | OUTPATIENT
Start: 2025-01-03 | End: 2025-01-03

## 2025-01-03 RX ORDER — CYANOCOBALAMIN 1000 UG/ML
1000 INJECTION, SOLUTION INTRAMUSCULAR; SUBCUTANEOUS ONCE
OUTPATIENT
Start: 2025-01-31

## 2025-01-03 RX ADMIN — CYANOCOBALAMIN 1000 MCG: 1000 INJECTION INTRAMUSCULAR; SUBCUTANEOUS at 11:08

## 2025-01-03 NOTE — PROGRESS NOTES
Sarahi Gutierrez  tolerated treatment well with no complications.      Sarahi Gutierrez is aware of future appt on 1/31 at 0930.     AVS printed and given to Sarahi Gutierrez:    No (Declined by Sarahi Gutierrez)

## 2025-01-15 ENCOUNTER — HOSPITAL ENCOUNTER (OUTPATIENT)
Dept: NUCLEAR MEDICINE | Facility: HOSPITAL | Age: 70
Discharge: HOME | End: 2025-01-15
Payer: COMMERCIAL

## 2025-01-15 DIAGNOSIS — R11.2 NAUSEA AND VOMITING, UNSPECIFIED VOMITING TYPE: ICD-10-CM

## 2025-01-15 PROCEDURE — 78264 GASTRIC EMPTYING IMG STUDY: CPT

## 2025-01-15 PROCEDURE — A9541 TC99M SULFUR COLLOID: HCPCS

## 2025-01-31 ENCOUNTER — HOSPITAL ENCOUNTER (OUTPATIENT)
Dept: INFUSION CENTER | Facility: HOSPITAL | Age: 70
Discharge: HOME | End: 2025-01-31
Attending: INTERNAL MEDICINE

## 2025-02-05 DIAGNOSIS — D50.9 IRON DEFICIENCY ANEMIA, UNSPECIFIED IRON DEFICIENCY ANEMIA TYPE: ICD-10-CM

## 2025-02-05 DIAGNOSIS — E53.8 B12 DEFICIENCY: Primary | ICD-10-CM

## 2025-02-05 RX ORDER — CYANOCOBALAMIN 1000 UG/ML
1000 INJECTION, SOLUTION INTRAMUSCULAR; SUBCUTANEOUS ONCE
Status: CANCELLED | OUTPATIENT
Start: 2025-02-07

## 2025-02-07 ENCOUNTER — HOSPITAL ENCOUNTER (OUTPATIENT)
Dept: INFUSION CENTER | Facility: HOSPITAL | Age: 70
End: 2025-02-07
Attending: INTERNAL MEDICINE
Payer: COMMERCIAL

## 2025-02-07 DIAGNOSIS — E53.8 B12 DEFICIENCY: ICD-10-CM

## 2025-02-07 DIAGNOSIS — D50.9 IRON DEFICIENCY ANEMIA, UNSPECIFIED IRON DEFICIENCY ANEMIA TYPE: Primary | ICD-10-CM

## 2025-02-07 PROCEDURE — 96372 THER/PROPH/DIAG INJ SC/IM: CPT

## 2025-02-07 RX ORDER — CYANOCOBALAMIN 1000 UG/ML
1000 INJECTION, SOLUTION INTRAMUSCULAR; SUBCUTANEOUS ONCE
OUTPATIENT
Start: 2025-03-07

## 2025-02-07 RX ORDER — CYANOCOBALAMIN 1000 UG/ML
1000 INJECTION, SOLUTION INTRAMUSCULAR; SUBCUTANEOUS ONCE
Status: COMPLETED | OUTPATIENT
Start: 2025-02-07 | End: 2025-02-07

## 2025-02-07 RX ADMIN — CYANOCOBALAMIN 1000 MCG: 1000 INJECTION, SOLUTION INTRAMUSCULAR at 10:12

## 2025-02-07 NOTE — PROGRESS NOTES
Sarahi Gutierrez  tolerated treatment well with no complications.      Sarahi Gutierrez is aware of future appt on 3/12 at 0930.     AVS printed and given to Sarahi Gutierrez:  No (Declined by Sarahi Gutierrez)       
show

## 2025-02-20 LAB
LEFT EYE DIABETIC RETINOPATHY: NORMAL
RIGHT EYE DIABETIC RETINOPATHY: NORMAL

## 2025-03-01 ENCOUNTER — APPOINTMENT (OUTPATIENT)
Dept: LAB | Facility: CLINIC | Age: 70
End: 2025-03-01
Payer: COMMERCIAL

## 2025-03-01 DIAGNOSIS — E53.8 B12 DEFICIENCY: ICD-10-CM

## 2025-03-01 DIAGNOSIS — D50.9 IRON DEFICIENCY ANEMIA, UNSPECIFIED IRON DEFICIENCY ANEMIA TYPE: ICD-10-CM

## 2025-03-01 LAB
FERRITIN SERPL-MCNC: 176 NG/ML (ref 11–307)
IRON SATN MFR SERPL: 21 % (ref 15–50)
IRON SERPL-MCNC: 96 UG/DL (ref 50–212)
TIBC SERPL-MCNC: 448 UG/DL (ref 250–450)
TRANSFERRIN SERPL-MCNC: 320 MG/DL (ref 203–362)
UIBC SERPL-MCNC: 352 UG/DL (ref 155–355)
VIT B12 SERPL-MCNC: 257 PG/ML (ref 180–914)

## 2025-03-01 PROCEDURE — 36415 COLL VENOUS BLD VENIPUNCTURE: CPT

## 2025-03-01 PROCEDURE — 83550 IRON BINDING TEST: CPT

## 2025-03-01 PROCEDURE — 83540 ASSAY OF IRON: CPT

## 2025-03-01 PROCEDURE — 82607 VITAMIN B-12: CPT

## 2025-03-01 PROCEDURE — 82728 ASSAY OF FERRITIN: CPT

## 2025-03-08 ENCOUNTER — APPOINTMENT (OUTPATIENT)
Dept: LAB | Facility: CLINIC | Age: 70
End: 2025-03-08
Payer: COMMERCIAL

## 2025-03-08 DIAGNOSIS — E11.65 TYPE 2 DIABETES MELLITUS WITH HYPERGLYCEMIA, WITHOUT LONG-TERM CURRENT USE OF INSULIN (HCC): ICD-10-CM

## 2025-03-08 DIAGNOSIS — E53.8 B12 DEFICIENCY: ICD-10-CM

## 2025-03-08 DIAGNOSIS — D50.9 IRON DEFICIENCY ANEMIA, UNSPECIFIED IRON DEFICIENCY ANEMIA TYPE: ICD-10-CM

## 2025-03-08 DIAGNOSIS — E78.2 MIXED HYPERLIPIDEMIA: ICD-10-CM

## 2025-03-08 DIAGNOSIS — E11.42 DIABETIC POLYNEUROPATHY ASSOCIATED WITH TYPE 2 DIABETES MELLITUS (HCC): ICD-10-CM

## 2025-03-08 DIAGNOSIS — I10 PRIMARY HYPERTENSION: ICD-10-CM

## 2025-03-08 LAB
ALBUMIN SERPL BCG-MCNC: 4.5 G/DL (ref 3.5–5)
ALP SERPL-CCNC: 30 U/L (ref 34–104)
ALT SERPL W P-5'-P-CCNC: 16 U/L (ref 7–52)
ANION GAP SERPL CALCULATED.3IONS-SCNC: 7 MMOL/L (ref 4–13)
AST SERPL W P-5'-P-CCNC: 13 U/L (ref 13–39)
BASOPHILS # BLD AUTO: 0.04 THOUSANDS/ÂΜL (ref 0–0.1)
BASOPHILS NFR BLD AUTO: 1 % (ref 0–1)
BILIRUB SERPL-MCNC: 0.48 MG/DL (ref 0.2–1)
BUN SERPL-MCNC: 16 MG/DL (ref 5–25)
CALCIUM SERPL-MCNC: 9.6 MG/DL (ref 8.4–10.2)
CHLORIDE SERPL-SCNC: 103 MMOL/L (ref 96–108)
CO2 SERPL-SCNC: 28 MMOL/L (ref 21–32)
CREAT SERPL-MCNC: 0.66 MG/DL (ref 0.6–1.3)
EOSINOPHIL # BLD AUTO: 0.27 THOUSAND/ÂΜL (ref 0–0.61)
EOSINOPHIL NFR BLD AUTO: 4 % (ref 0–6)
ERYTHROCYTE [DISTWIDTH] IN BLOOD BY AUTOMATED COUNT: 13.2 % (ref 11.6–15.1)
EST. AVERAGE GLUCOSE BLD GHB EST-MCNC: 166 MG/DL
GFR SERPL CREATININE-BSD FRML MDRD: 90 ML/MIN/1.73SQ M
GLUCOSE P FAST SERPL-MCNC: 124 MG/DL (ref 65–99)
HBA1C MFR BLD: 7.4 %
HCT VFR BLD AUTO: 35.5 % (ref 34.8–46.1)
HGB BLD-MCNC: 11.3 G/DL (ref 11.5–15.4)
IMM GRANULOCYTES # BLD AUTO: 0.04 THOUSAND/UL (ref 0–0.2)
IMM GRANULOCYTES NFR BLD AUTO: 1 % (ref 0–2)
LYMPHOCYTES # BLD AUTO: 1.71 THOUSANDS/ÂΜL (ref 0.6–4.47)
LYMPHOCYTES NFR BLD AUTO: 28 % (ref 14–44)
MCH RBC QN AUTO: 29.7 PG (ref 26.8–34.3)
MCHC RBC AUTO-ENTMCNC: 31.8 G/DL (ref 31.4–37.4)
MCV RBC AUTO: 93 FL (ref 82–98)
MONOCYTES # BLD AUTO: 0.64 THOUSAND/ÂΜL (ref 0.17–1.22)
MONOCYTES NFR BLD AUTO: 11 % (ref 4–12)
NEUTROPHILS # BLD AUTO: 3.38 THOUSANDS/ÂΜL (ref 1.85–7.62)
NEUTS SEG NFR BLD AUTO: 55 % (ref 43–75)
NRBC BLD AUTO-RTO: 0 /100 WBCS
PLATELET # BLD AUTO: 368 THOUSANDS/UL (ref 149–390)
PMV BLD AUTO: 10.1 FL (ref 8.9–12.7)
POTASSIUM SERPL-SCNC: 3.8 MMOL/L (ref 3.5–5.3)
PROT SERPL-MCNC: 7.3 G/DL (ref 6.4–8.4)
RBC # BLD AUTO: 3.8 MILLION/UL (ref 3.81–5.12)
SODIUM SERPL-SCNC: 138 MMOL/L (ref 135–147)
WBC # BLD AUTO: 6.08 THOUSAND/UL (ref 4.31–10.16)

## 2025-03-08 PROCEDURE — 80053 COMPREHEN METABOLIC PANEL: CPT

## 2025-03-08 PROCEDURE — 85025 COMPLETE CBC W/AUTO DIFF WBC: CPT

## 2025-03-08 PROCEDURE — 83036 HEMOGLOBIN GLYCOSYLATED A1C: CPT

## 2025-03-08 PROCEDURE — 83918 ORGANIC ACIDS TOTAL QUANT: CPT

## 2025-03-08 PROCEDURE — 36415 COLL VENOUS BLD VENIPUNCTURE: CPT

## 2025-03-09 ENCOUNTER — RESULTS FOLLOW-UP (OUTPATIENT)
Dept: ENDOCRINOLOGY | Facility: HOSPITAL | Age: 70
End: 2025-03-09

## 2025-03-10 ENCOUNTER — PATIENT MESSAGE (OUTPATIENT)
Dept: ENDOCRINOLOGY | Facility: HOSPITAL | Age: 70
End: 2025-03-10

## 2025-03-10 DIAGNOSIS — D50.9 IRON DEFICIENCY ANEMIA, UNSPECIFIED IRON DEFICIENCY ANEMIA TYPE: ICD-10-CM

## 2025-03-10 DIAGNOSIS — E53.8 B12 DEFICIENCY: Primary | ICD-10-CM

## 2025-03-10 RX ORDER — CYANOCOBALAMIN 1000 UG/ML
1000 INJECTION, SOLUTION INTRAMUSCULAR; SUBCUTANEOUS ONCE
Status: CANCELLED | OUTPATIENT
Start: 2025-03-12

## 2025-03-11 PROBLEM — D64.9 NORMOCYTIC ANEMIA: Status: ACTIVE | Noted: 2024-05-07

## 2025-03-11 NOTE — PROGRESS NOTES
"Name: Sarahi Gutierrez      : 1955      MRN: [id number]  Encounter Provider: TAYLOR Mohamud  Encounter Date: 3/12/2025   Encounter department: [facility]  :  Assessment & Plan  B12 deficiency  Continue injectable B12 replacement monthly  Normocytic anemia  Patient is a 69-year-old female with a mild normocytic anemia that is likely multifactorial and secondary to chronic disease, substrate deficiency from malabsorption from hiatal hernia, gastroparesis, Castelan's esophagus and metformin use which is associated with B12 deficiency.  Her hemoglobin has improved since starting injectable B12 replacement.    Return in about 6 months (around 2025) for Megan, bloodwork.    History of Present Illness   Chief Complaint   Patient presents with    Follow-up     Pertinent Medical History     25: Marija presents for follow-up for history of anemia, B12 deficiency.  Receiving injectable B12 replacement monthly.  Blood work done prior to today's visit shows mild normocytic anemia with a hemoglobin 11.3.  Serum B12 remains in a low normal range at 257  She has noticed more energy since starting injectable B12 replacement.  Recently diagnosed with gastroparesis.  Hemoglobin improved, serum B12 remains quite low.  Iron stores normal     Review of Systems   All other systems reviewed and are negative.    Medical History Reviewed by provider this encounter:     .      Objective   /69 (BP Location: Left arm, Patient Position: Sitting, Cuff Size: Standard)   Pulse 82   Temp 97.6 °F (36.4 °C) (Temporal)   Resp 16   Ht 5' 6\" (1.676 m)   Wt 77.6 kg (171 lb)   LMP  (LMP Unknown)   SpO2 98%   BMI 27.60 kg/m²     Physical Exam  Constitutional:       General: She is not in acute distress.     Appearance: Normal appearance.   HENT:      Head: Normocephalic and atraumatic.   Eyes:      General: No scleral icterus.        Right eye: No discharge.        "  Left eye: No discharge.      Conjunctiva/sclera: Conjunctivae normal.   Cardiovascular:      Rate and Rhythm: Normal rate and regular rhythm.   Pulmonary:      Effort: Pulmonary effort is normal. No respiratory distress.      Breath sounds: Normal breath sounds.   Abdominal:      General: Bowel sounds are normal. There is no distension.      Palpations: Abdomen is soft. There is no mass.      Tenderness: There is no abdominal tenderness.   Musculoskeletal:         General: Normal range of motion.   Lymphadenopathy:      Cervical: No cervical adenopathy.      Upper Body:      Right upper body: No supraclavicular, axillary or pectoral adenopathy.      Left upper body: No supraclavicular, axillary or pectoral adenopathy.   Skin:     General: Skin is warm and dry.   Neurological:      General: No focal deficit present.      Mental Status: She is alert and oriented to person, place, and time.   Psychiatric:         Mood and Affect: Mood normal.         Behavior: Behavior normal.         Labs: I have reviewed the following labs:  Results for orders placed or performed in visit on 03/08/25   Comprehensive metabolic panel   Result Value Ref Range    Sodium 138 135 - 147 mmol/L    Potassium 3.8 3.5 - 5.3 mmol/L    Chloride 103 96 - 108 mmol/L    CO2 28 21 - 32 mmol/L    ANION GAP 7 4 - 13 mmol/L    BUN 16 5 - 25 mg/dL    Creatinine 0.66 0.60 - 1.30 mg/dL    Glucose, Fasting 124 (H) 65 - 99 mg/dL    Calcium 9.6 8.4 - 10.2 mg/dL    AST 13 13 - 39 U/L    ALT 16 7 - 52 U/L    Alkaline Phosphatase 30 (L) 34 - 104 U/L    Total Protein 7.3 6.4 - 8.4 g/dL    Albumin 4.5 3.5 - 5.0 g/dL    Total Bilirubin 0.48 0.20 - 1.00 mg/dL    eGFR 90 ml/min/1.73sq m   CBC and differential   Result Value Ref Range    WBC 6.08 4.31 - 10.16 Thousand/uL    RBC 3.80 (L) 3.81 - 5.12 Million/uL    Hemoglobin 11.3 (L) 11.5 - 15.4 g/dL    Hematocrit 35.5 34.8 - 46.1 %    MCV 93 82 - 98 fL    MCH 29.7 26.8 - 34.3 pg    MCHC 31.8 31.4 - 37.4 g/dL    RDW  13.2 11.6 - 15.1 %    MPV 10.1 8.9 - 12.7 fL    Platelets 368 149 - 390 Thousands/uL    nRBC 0 /100 WBCs    Segmented % 55 43 - 75 %    Immature Grans % 1 0 - 2 %    Lymphocytes % 28 14 - 44 %    Monocytes % 11 4 - 12 %    Eosinophils Relative 4 0 - 6 %    Basophils Relative 1 0 - 1 %    Absolute Neutrophils 3.38 1.85 - 7.62 Thousands/µL    Absolute Immature Grans 0.04 0.00 - 0.20 Thousand/uL    Absolute Lymphocytes 1.71 0.60 - 4.47 Thousands/µL    Absolute Monocytes 0.64 0.17 - 1.22 Thousand/µL    Eosinophils Absolute 0.27 0.00 - 0.61 Thousand/µL    Basophils Absolute 0.04 0.00 - 0.10 Thousands/µL   Hemoglobin A1C   Result Value Ref Range    Hemoglobin A1C 7.4 (H) Normal 4.0-5.6%; PreDiabetic 5.7-6.4%; Diabetic >=6.5%; Glycemic control for adults with diabetes <7.0% %     mg/dl     Lab Results   Component Value Date/Time    Iron 96 03/01/2025 11:17 AM    Iron Saturation 21 03/01/2025 11:17 AM    Ferritin 176 03/01/2025 11:17 AM    Vitamin B-12 257 03/01/2025 11:17 AM            Administrative Statements   I have spent a total time of 25 minutes in caring for this patient on the day of the visit/encounter including Instructions for management, Impressions, Documenting in the medical record, Reviewing/placing orders in the medical record (including tests, medications, and/or procedures), and Obtaining or reviewing history  .

## 2025-03-11 NOTE — ASSESSMENT & PLAN NOTE
Patient is a 69-year-old female with a mild normocytic anemia that is likely multifactorial and secondary to chronic disease, substrate deficiency from malabsorption from hiatal hernia, gastroparesis, Castelan's esophagus and metformin use which is associated with B12 deficiency.

## 2025-03-12 ENCOUNTER — TELEPHONE (OUTPATIENT)
Age: 70
End: 2025-03-12

## 2025-03-12 ENCOUNTER — HOSPITAL ENCOUNTER (OUTPATIENT)
Dept: INFUSION CENTER | Facility: HOSPITAL | Age: 70
Discharge: HOME | End: 2025-03-12
Attending: INTERNAL MEDICINE
Payer: COMMERCIAL

## 2025-03-12 ENCOUNTER — OFFICE VISIT (OUTPATIENT)
Age: 70
End: 2025-03-12
Payer: COMMERCIAL

## 2025-03-12 VITALS
HEIGHT: 66 IN | BODY MASS INDEX: 27.48 KG/M2 | WEIGHT: 171 LBS | HEART RATE: 82 BPM | TEMPERATURE: 97.6 F | DIASTOLIC BLOOD PRESSURE: 69 MMHG | RESPIRATION RATE: 16 BRPM | OXYGEN SATURATION: 98 % | SYSTOLIC BLOOD PRESSURE: 125 MMHG

## 2025-03-12 DIAGNOSIS — D64.9 NORMOCYTIC ANEMIA: Primary | ICD-10-CM

## 2025-03-12 DIAGNOSIS — E53.8 B12 DEFICIENCY: ICD-10-CM

## 2025-03-12 DIAGNOSIS — E53.8 B12 DEFICIENCY: Primary | ICD-10-CM

## 2025-03-12 DIAGNOSIS — D64.9 NORMOCYTIC ANEMIA: ICD-10-CM

## 2025-03-12 LAB — METHYLMALONATE SERPL-SCNC: 157 NMOL/L (ref 0–378)

## 2025-03-12 PROCEDURE — 96372 THER/PROPH/DIAG INJ SC/IM: CPT

## 2025-03-12 PROCEDURE — 99213 OFFICE O/P EST LOW 20 MIN: CPT | Performed by: NURSE PRACTITIONER

## 2025-03-12 RX ORDER — CYANOCOBALAMIN 1000 UG/ML
1000 INJECTION, SOLUTION INTRAMUSCULAR; SUBCUTANEOUS ONCE
OUTPATIENT
Start: 2025-04-09

## 2025-03-12 RX ORDER — CYANOCOBALAMIN 1000 UG/ML
1000 INJECTION, SOLUTION INTRAMUSCULAR; SUBCUTANEOUS ONCE
Status: COMPLETED | OUTPATIENT
Start: 2025-03-12 | End: 2025-03-12

## 2025-03-12 RX ADMIN — CYANOCOBALAMIN 1000 MCG: 1000 INJECTION, SOLUTION INTRAMUSCULAR at 09:09

## 2025-03-12 NOTE — PROGRESS NOTES
Sarahi Gutierrez  tolerated treatment well with no complications.      Pt has appt with Hematology today, unsure if she is continuing B12. States she will call back to schedule appt if continuing.     AVS printed and given to Sarahi Gutierrez:   No (Declined by Sarahi Gutierrez)

## 2025-03-18 ENCOUNTER — OFFICE VISIT (OUTPATIENT)
Dept: ENDOCRINOLOGY | Facility: HOSPITAL | Age: 70
End: 2025-03-18
Payer: COMMERCIAL

## 2025-03-18 VITALS
DIASTOLIC BLOOD PRESSURE: 80 MMHG | BODY MASS INDEX: 27.87 KG/M2 | HEART RATE: 86 BPM | SYSTOLIC BLOOD PRESSURE: 122 MMHG | WEIGHT: 173.4 LBS | HEIGHT: 66 IN

## 2025-03-18 DIAGNOSIS — E11.65 TYPE 2 DIABETES MELLITUS WITH HYPERGLYCEMIA, WITHOUT LONG-TERM CURRENT USE OF INSULIN (HCC): Primary | ICD-10-CM

## 2025-03-18 DIAGNOSIS — E11.42 DIABETIC POLYNEUROPATHY ASSOCIATED WITH TYPE 2 DIABETES MELLITUS (HCC): ICD-10-CM

## 2025-03-18 DIAGNOSIS — I10 PRIMARY HYPERTENSION: ICD-10-CM

## 2025-03-18 DIAGNOSIS — E78.2 MIXED HYPERLIPIDEMIA: ICD-10-CM

## 2025-03-18 PROBLEM — E11.43 GASTROPARESIS DUE TO DM  (HCC): Status: ACTIVE | Noted: 2025-03-18

## 2025-03-18 PROBLEM — K31.84 GASTROPARESIS DUE TO DM  (HCC): Status: ACTIVE | Noted: 2025-03-18

## 2025-03-18 PROCEDURE — 95251 CONT GLUC MNTR ANALYSIS I&R: CPT | Performed by: INTERNAL MEDICINE

## 2025-03-18 PROCEDURE — 99214 OFFICE O/P EST MOD 30 MIN: CPT | Performed by: INTERNAL MEDICINE

## 2025-03-18 RX ORDER — LANCETS 30 GAUGE
EACH MISCELLANEOUS
Qty: 100 EACH | Refills: 3 | Status: SHIPPED | OUTPATIENT
Start: 2025-03-18

## 2025-03-18 RX ORDER — BLOOD SUGAR DIAGNOSTIC
STRIP MISCELLANEOUS
Qty: 100 STRIP | Refills: 3 | Status: SHIPPED | OUTPATIENT
Start: 2025-03-18

## 2025-03-18 RX ORDER — BLOOD-GLUCOSE METER
EACH MISCELLANEOUS
Qty: 1 KIT | Refills: 0 | Status: SHIPPED | OUTPATIENT
Start: 2025-03-18

## 2025-03-18 NOTE — PROGRESS NOTES
3/18/2025    Assessment & Plan      Diagnoses and all orders for this visit:    Type 2 diabetes mellitus with hyperglycemia, without long-term current use of insulin (HCC)  -     Comprehensive metabolic panel; Future  -     Hemoglobin A1C; Future  -     TSH, 3rd generation; Future  -     Albumin / creatinine urine ratio; Future  -     Blood Glucose Monitoring Suppl (OneTouch Verio) w/Device KIT; Use to test blood sugars once daily.  -     glucose blood (OneTouch Verio) test strip; Use as instructed once daily  -     OneTouch Delica Lancets 30G MISC; Use once daily    Diabetic polyneuropathy associated with type 2 diabetes mellitus (HCC)  -     Comprehensive metabolic panel; Future  -     Hemoglobin A1C; Future  -     TSH, 3rd generation; Future  -     Albumin / creatinine urine ratio; Future    Primary hypertension  -     Comprehensive metabolic panel; Future  -     Hemoglobin A1C; Future  -     TSH, 3rd generation; Future  -     Albumin / creatinine urine ratio; Future    Mixed hyperlipidemia  -     Comprehensive metabolic panel; Future  -     Hemoglobin A1C; Future  -     TSH, 3rd generation; Future  -     Albumin / creatinine urine ratio; Future          Assessment & Plan  1. Type 2 Diabetes Mellitus.  Her hemoglobin A1c level is slightly elevated at 7.4. She reports no blurry vision, numbness, or tingling in her feet. She is currently on Janumet /1000 mg in the morning, metformin  mg two at supper time, and pioglitazone 45 mg daily. She has been advised to take pioglitazone with a larger meal and to maintain consistent timing. She has been instructed to monitor her blood sugar levels throughout the day, particularly after breakfast, and to ensure her breakfast is not excessively high in carbohydrates. She has also been advised to consume a small amount of protein before bedtime to help manage her blood sugar levels in the morning. She has been informed that metformin should be taken with food for  optimal effectiveness.  She will continue to utilize a freestyle osman 2 continuous glucose monitoring system.  A prescription for One Touch Verio with test strips and lancets will be sent to the [facility] on [facility].    2. Gastroparesis.  She has been diagnosed with gastroparesis, likely exacerbated by a recent norovirus infection. She has been advised to eat small, frequent meals and avoid large meals to manage her symptoms. She has also eliminated carbonated beverages from her diet. She reports no recent vomiting episodes.    3. Hypertension.  She is currently on valsartan HCTZ 320/25 mg for blood pressure management. She reports no chest pain or shortness of breath.    4. Hyperlipidemia.  She is taking atorvastatin 40 mg and fenofibrate 54 mg daily for cholesterol management.    5.  Diabetic neuropathy.  She has no neuropathic symptoms.  Diabetic foot exam was performed in the office today.    I have asked her to follow-up in 3 to 4 months with preceding hemoglobin A1c, CMP, TSH, and urine microalbumin to creatinine ratio.        CC: Diabetes type 2, blood pressure, lipid follow-up    History of Present Illness    HPI: Sarahi Gutierrez is a 69-year-old female with a history of type 2 diabetes with neuropathy diagnosed 20 years ago, hypertension, and hyperlipidemia, here for a follow-up visit.     Diabetes complications include neuropathy. She reports no nephropathy, retinopathy, heart attack, stroke, or claudication.    Her current medication regimen includes Janumet /1000 mg in the morning, metformin  mg 2 at supper time, and pioglitazone 45 mg daily.  She experiences regular urination, typically waking up around 5 or 6 AM to urinate. She occasionally feels thirsty or hungry, particularly when her blood sugar levels are low. She has been managing her diet to avoid triggers and has not experienced any recent vomiting episodes. She has been using an old Contour glucose monitor but questions its  reliability. She has requested a prescription for a One Touch glucose monitor for travel purposes. She has observed that her morning blood sugar levels remain high, even when fasting. She has tried taking metformin before meals to see if it would help regulate her blood sugar levels, but it did not seem to make a difference. She has noticed that her blood sugar levels tend to be lowest after breakfast.     Over the holidays, she contracted norovirus and was subsequently diagnosed with gastroparesis. She has been advised to consume small, frequent meals and avoid large meals to manage her condition.    She reports no blurry vision but has been diagnosed with macular degeneration, which she believes may be related to her diabetes. She occasionally sees floaters.  Last diabetic eye exam was within the last month.    She does not experience any numbness or tingling in her feet and has no foot wounds. She applies Arnica cream in the morning and magnesium at night to improve sleep quality.  Last diabetic foot exam was over a year ago.    She is also on atorvastatin 40 mg and fenofibrate 54 mg daily for cholesterol management.    She is on valsartan HCTZ 320/25 mg for blood pressure control.    She has been receiving B12 injections due to low levels, which her hematologist attributes to long-term use of metformin and omeprazole. She reports no chest pain or shortness of breath.    Supplemental Information  She had iron infusions over the summer and her iron levels are fine now. She is still taking iron pills with her multivitamin. She uses a CPAP machine for an average of 5.5 hours per night.    Blood Sugar/Glucometer/Pump/CGM review: She uses a freestyle osman 2+ continuous glucose monitoring system to test her blood sugars throughout the day.  Freestyle osman download from 3/5/2025 through 3/18/2025 was reviewed in the office today.  CGM was active 62% of the time.  Average glucose is 132 mg/dL with a glucose variability  of 17.8%.  96% of blood sugars are in target range, 4% high, 0% very high, 0% low, and 0% very low.  She does have some spikes in blood sugars post breakfast.      Historical Information   Past Medical History:   Diagnosis Date    Castelan's esophagus 2023    Bicornate uterus     Diabetes mellitus (HCC)     Diagnosed by family provider    Gastroparesis     GERD (gastroesophageal reflux disease)     Hyperlipidemia     Hypertension     Macular degeneration of left eye     Sleep apnea     Sleep apnea, obstructive 2013    Probably had it earlier in my life     Past Surgical History:   Procedure Laterality Date     SECTION      X 2    CHOLECYSTECTOMY      lap    TUBAL LIGATION Bilateral      Social History   Social History     Substance and Sexual Activity   Alcohol Use Yes    Alcohol/week: 2.0 standard drinks of alcohol    Types: 1 Glasses of wine, 1 Cans of beer per week    Comment: Reduced consumption social     Social History     Substance and Sexual Activity   Drug Use Never     Social History     Tobacco Use   Smoking Status Former    Current packs/day: 0.00    Average packs/day: 0.3 packs/day for 10.0 years (2.5 ttl pk-yrs)    Types: Cigarettes    Start date: 1975    Quit date: 1985    Years since quittin.2   Smokeless Tobacco Never   Tobacco Comments    Social habit, late teens through late 20s     Family History:   Family History   Problem Relation Age of Onset    Myocarditis Mother     Melanoma Father     Diabetes type II Father             Stroke Father     Diabetes Father          at 85 yrs. - managed disease w/ metformin    Diabetes type II Sister         Recently diagnosed    Hypertension Sister         Managed with meds    No Known Problems Daughter     No Known Problems Daughter     Breast cancer Maternal Grandmother          at an early age    No Known Problems Maternal Grandfather     No Known Problems Paternal Grandmother     No Known Problems  Paternal Grandfather     Hypertension Brother         Managed with meds    Diabetes type II Brother         Recently Diagnosed    Breast cancer Maternal Aunt         unknown age of onset    No Known Problems Maternal Aunt     No Known Problems Paternal Aunt     Diabetes type II Paternal Uncle                Meds/Allergies   Current Outpatient Medications   Medication Sig Dispense Refill    atorvastatin (LIPITOR) 40 mg tablet Take 40 mg by mouth in the morning        Blood Glucose Monitoring Suppl (OneTouch Verio) w/Device KIT Use to test blood sugars once daily. 1 kit 0    doxycycline (VIBRAMYCIN) Take 2 immediately at discovery of attached tick Oral 2 at once-single dose for non pregnant, non lactating adults      fenofibrate (TRICOR) 54 MG tablet take 1 tablet by mouth once daily with meals      ferrous sulfate 325 (65 Fe) mg tablet every 24 hours      fluticasone (Flonase Allergy Relief) 50 mcg/act nasal spray 2 sprays every 24 hours      glucose blood (OneTouch Verio) test strip Use as instructed once daily 100 strip 3    Janumet -1000 MG TB24 Take 1 tablet by mouth every morning 90 tablet 3    Magnesium Cl-Calcium Carbonate (SLOW-MAG PO) Take by mouth in the morning      metFORMIN (GLUCOPHAGE-XR) 500 mg 24 hr tablet Take 2 tablets (1,000 mg total) by mouth daily with dinner 180 tablet 3    MISC NATURAL PRODUCTS EX Apply topically Magnesium cream to legs at night for leg cramps      Multiple Vitamins-Minerals (MULTIVITAMIN ADULTS 50+ PO) Orally      Multiple Vitamins-Minerals (PRESERVISION AREDS PO) Take by mouth in the morning      nitrofurantoin (MACRODANTIN) 50 mg capsule prn      omeprazole (PriLOSEC) 20 mg delayed release capsule Take 40 mg by mouth daily      ondansetron (ZOFRAN) 4 mg tablet Take 1 tablet (4 mg total) by mouth every 8 (eight) hours as needed for nausea or vomiting 10 tablet 0    OneTouch Delica Lancets 30G MISC Use once daily 100 each 3    pioglitazone (ACTOS) 45 mg tablet  "Take 1 tablet (45 mg total) by mouth daily 90 tablet 0    valsartan-hydrochlorothiazide (DIOVAN-HCT) 320-25 MG per tablet Take 1 tablet by mouth daily       No current facility-administered medications for this visit.     Allergies   Allergen Reactions    Jardiance [Empagliflozin] Other (See Comments)     Frequent yeast infections    Iodinated Contrast Media Rash       Objective   Vitals: Blood pressure 122/80, pulse 86, height 5' 6\" (1.676 m), weight 78.7 kg (173 lb 6.4 oz).  Invasive Devices       None                   Physical Exam    Thyroid is normal in size. No palpable nodules.  Lungs are clear to auscultation.  Heart has a regular rate and rhythm. No murmurs.  Trace bilateral lower extremity edema, left greater than right. No ulcerations of the feet. Dorsalis pedis and posterior tibialis pulses 1+. Vibration sensation diminished to the first toe DIP joint bilaterally. Monofilament sensation intact, though slightly diminished to the right heel.  Patient's shoes and socks removed.    Right Foot/Ankle   Right Foot Inspection  Skin Exam: skin normal and skin intact. No dry skin, no warmth, no callus, no erythema, no maceration, no abnormal color, no pre-ulcer, no ulcer and no callus.     Toe Exam: No swelling and  no right toe deformity    Sensory   Vibration: diminished  Monofilament testing: intact    Vascular  The right DP pulse is 1+. The right PT pulse is 1+.     Left Foot/Ankle  Left Foot Inspection  Skin Exam: skin normal and skin intact. No dry skin, no warmth, no erythema, no maceration, normal color, no pre-ulcer, no ulcer and no callus.     Toe Exam: No swelling and no left toe deformity.     Sensory   Vibration: diminished  Monofilament testing: intact    Vascular  The left DP pulse is 1+. The left PT pulse is 1+.     Assign Risk Category  No deformity present  Loss of protective sensation  Weak pulses  Risk: 2        The history was obtained from the review of the chart and from the patient.    Lab " Results:    Most recent Alc is  Lab Results   Component Value Date    HGBA1C 7.4 (H) 03/08/2025           Blood work performed on 3/8/2025 showed a CBC with a hemoglobin of 11.3 but was otherwise normal.    CMP showed glucose of 124 fasting but was otherwise normal.    Lab Results   Component Value Date    CREATININE 0.66 03/08/2025    CREATININE 0.68 11/04/2024    CREATININE 0.68 06/24/2024    BUN 16 03/08/2025    K 3.8 03/08/2025     03/08/2025    CO2 28 03/08/2025     eGFR   Date Value Ref Range Status   03/08/2025 90 ml/min/1.73sq m Final         Lab Results   Component Value Date    HDL 67 11/08/2023    TRIG 110 11/08/2023       Lab Results   Component Value Date    ALT 16 03/08/2025    AST 13 03/08/2025    ALKPHOS 30 (L) 03/08/2025       Lab Results   Component Value Date    TSH 3.18 07/11/2023             Future Appointments   Date Time Provider Department Center   4/9/2025 12:30 PM [facility]   5/7/2025  9:00 AM [facility]   6/4/2025 11:30 AM [facility]   7/22/2025  2:40 PM Elisa Madera MD [facility]   9/12/2025  9:00 AM TAYLOR Mohamud HEM ON[facility]

## 2025-03-18 NOTE — PATIENT INSTRUCTIONS
Hgba1c is 7.4%. this is a bit higher.     Work on diet  with frequent small meals with lower carb.     Continue the same pioglitazone, januvia and metformin.     Continue to use the freestyle osman 2+.    Follow up in 3-4 months with blood work.

## 2025-04-09 ENCOUNTER — HOSPITAL ENCOUNTER (OUTPATIENT)
Dept: INFUSION CENTER | Facility: HOSPITAL | Age: 70
Discharge: HOME | End: 2025-04-09
Attending: INTERNAL MEDICINE
Payer: COMMERCIAL

## 2025-04-09 VITALS
DIASTOLIC BLOOD PRESSURE: 80 MMHG | HEART RATE: 77 BPM | SYSTOLIC BLOOD PRESSURE: 134 MMHG | TEMPERATURE: 98.6 F | RESPIRATION RATE: 16 BRPM

## 2025-04-09 DIAGNOSIS — D64.9 NORMOCYTIC ANEMIA: Primary | ICD-10-CM

## 2025-04-09 DIAGNOSIS — E53.8 B12 DEFICIENCY: ICD-10-CM

## 2025-04-09 RX ORDER — CYANOCOBALAMIN 1000 UG/ML
1000 INJECTION, SOLUTION INTRAMUSCULAR; SUBCUTANEOUS ONCE
OUTPATIENT
Start: 2025-05-07

## 2025-04-09 RX ORDER — CYANOCOBALAMIN 1000 UG/ML
1000 INJECTION, SOLUTION INTRAMUSCULAR; SUBCUTANEOUS ONCE
Status: COMPLETED | OUTPATIENT
Start: 2025-04-09 | End: 2025-04-09

## 2025-04-09 RX ADMIN — CYANOCOBALAMIN 1000 MCG: 1000 INJECTION, SOLUTION INTRAMUSCULAR at 12:42

## 2025-04-09 NOTE — PROGRESS NOTES
Sarahi Gutierrez  tolerated treatment well with no complications.      Sarahi Gutierrez is aware of future appt on 05/07/2025 at 09:00 AM.     AVS printed and given to Sarahi Gutierrez:  No (Declined by Sarahi Gutierrez)

## 2025-04-23 NOTE — PROGRESS NOTES
Sarahi Gutierrez  tolerated treatment well with no complications.      Sarahi Gutierrez is aware of future appt on 10/11/2024 at .   1130  AVS printed and given to Sarahi Evans (Declined by Sarahi Gutierrez)        10

## 2025-05-07 ENCOUNTER — HOSPITAL ENCOUNTER (OUTPATIENT)
Dept: INFUSION CENTER | Facility: HOSPITAL | Age: 70
Discharge: HOME | End: 2025-05-07
Attending: INTERNAL MEDICINE
Payer: COMMERCIAL

## 2025-05-07 VITALS
SYSTOLIC BLOOD PRESSURE: 116 MMHG | TEMPERATURE: 97.1 F | DIASTOLIC BLOOD PRESSURE: 59 MMHG | OXYGEN SATURATION: 100 % | HEART RATE: 76 BPM | RESPIRATION RATE: 16 BRPM

## 2025-05-07 DIAGNOSIS — E53.8 B12 DEFICIENCY: ICD-10-CM

## 2025-05-07 DIAGNOSIS — D64.9 NORMOCYTIC ANEMIA: Primary | ICD-10-CM

## 2025-05-07 PROCEDURE — 96372 THER/PROPH/DIAG INJ SC/IM: CPT

## 2025-05-07 RX ORDER — CYANOCOBALAMIN 1000 UG/ML
1000 INJECTION, SOLUTION INTRAMUSCULAR; SUBCUTANEOUS ONCE
OUTPATIENT
Start: 2025-06-04

## 2025-05-07 RX ORDER — CYANOCOBALAMIN 1000 UG/ML
1000 INJECTION, SOLUTION INTRAMUSCULAR; SUBCUTANEOUS ONCE
Status: COMPLETED | OUTPATIENT
Start: 2025-05-07 | End: 2025-05-07

## 2025-05-07 RX ADMIN — CYANOCOBALAMIN 1000 MCG: 1000 INJECTION, SOLUTION INTRAMUSCULAR at 09:17

## 2025-05-07 NOTE — PROGRESS NOTES
Sarahi Gutierrez  tolerated treatment well with no complications.      Sarahi Gutierrez is aware of future appt on 6/4/25 at 1130.     AVS printed and given to Sarahi Gutierrez:    No (Declined by Sarahi Gutierrez)

## 2025-06-04 ENCOUNTER — HOSPITAL ENCOUNTER (OUTPATIENT)
Dept: INFUSION CENTER | Facility: HOSPITAL | Age: 70
Discharge: HOME | End: 2025-06-04
Attending: INTERNAL MEDICINE
Payer: COMMERCIAL

## 2025-06-04 DIAGNOSIS — E53.8 B12 DEFICIENCY: ICD-10-CM

## 2025-06-04 DIAGNOSIS — D64.9 NORMOCYTIC ANEMIA: Primary | ICD-10-CM

## 2025-06-04 PROCEDURE — 96372 THER/PROPH/DIAG INJ SC/IM: CPT

## 2025-06-04 RX ORDER — CYANOCOBALAMIN 1000 UG/ML
1000 INJECTION, SOLUTION INTRAMUSCULAR; SUBCUTANEOUS ONCE
Status: COMPLETED | OUTPATIENT
Start: 2025-06-04 | End: 2025-06-04

## 2025-06-04 RX ORDER — CYANOCOBALAMIN 1000 UG/ML
1000 INJECTION, SOLUTION INTRAMUSCULAR; SUBCUTANEOUS ONCE
OUTPATIENT
Start: 2025-07-02

## 2025-06-04 RX ADMIN — CYANOCOBALAMIN 1000 MCG: 1000 INJECTION, SOLUTION INTRAMUSCULAR at 11:38

## 2025-06-04 NOTE — PROGRESS NOTES
Sarahi Gutierrez  tolerated treatment well with no complications.      Sarahi Gutierrez is aware of future appt on 7/2 at 1130.     AVS printed and given to Sarahi Gutierrez:    No (Declined by Sarahi Gutierrez)

## 2025-07-02 ENCOUNTER — HOSPITAL ENCOUNTER (OUTPATIENT)
Dept: INFUSION CENTER | Facility: HOSPITAL | Age: 70
Discharge: HOME | End: 2025-07-02
Attending: INTERNAL MEDICINE
Payer: COMMERCIAL

## 2025-07-02 DIAGNOSIS — E53.8 B12 DEFICIENCY: ICD-10-CM

## 2025-07-02 DIAGNOSIS — D64.9 NORMOCYTIC ANEMIA: Primary | ICD-10-CM

## 2025-07-02 RX ORDER — CYANOCOBALAMIN 1000 UG/ML
1000 INJECTION, SOLUTION INTRAMUSCULAR; SUBCUTANEOUS ONCE
Status: COMPLETED | OUTPATIENT
Start: 2025-07-02 | End: 2025-07-02

## 2025-07-02 RX ORDER — CYANOCOBALAMIN 1000 UG/ML
1000 INJECTION, SOLUTION INTRAMUSCULAR; SUBCUTANEOUS ONCE
OUTPATIENT
Start: 2025-07-30

## 2025-07-02 RX ADMIN — CYANOCOBALAMIN 1000 MCG: 1000 INJECTION, SOLUTION INTRAMUSCULAR at 11:34

## 2025-07-02 NOTE — PROGRESS NOTES
Sarahi Gutierrez  tolerated treatment well with no complications.      Sarahi Gutierrez is aware of future appt on 8/1/25 at 1000.     AVS printed and given to Sarahi Gutierrez:  No (Declined by Sarahi Gutierrez)

## 2025-07-04 DIAGNOSIS — E11.65 TYPE 2 DIABETES MELLITUS WITH HYPERGLYCEMIA, WITHOUT LONG-TERM CURRENT USE OF INSULIN (HCC): ICD-10-CM

## 2025-07-07 RX ORDER — SITAGLIPTIN AND METFORMIN HYDROCHLORIDE 1000; 100 MG/1; MG/1
1 TABLET, FILM COATED, EXTENDED RELEASE ORAL EVERY MORNING
Qty: 90 TABLET | Refills: 3 | Status: SHIPPED | OUTPATIENT
Start: 2025-07-07

## 2025-07-18 ENCOUNTER — CLINICAL SUPPORT (OUTPATIENT)
Dept: NUTRITION | Facility: HOSPITAL | Age: 70
End: 2025-07-18
Attending: INTERNAL MEDICINE
Payer: COMMERCIAL

## 2025-07-18 VITALS — BODY MASS INDEX: 27.7 KG/M2 | WEIGHT: 171.6 LBS

## 2025-07-18 DIAGNOSIS — K31.84 GASTROPARESIS: ICD-10-CM

## 2025-07-18 DIAGNOSIS — E11.65 POORLY CONTROLLED TYPE 2 DIABETES MELLITUS WITH GASTROPARESIS (HCC): Primary | ICD-10-CM

## 2025-07-18 DIAGNOSIS — E11.43 POORLY CONTROLLED TYPE 2 DIABETES MELLITUS WITH GASTROPARESIS (HCC): Primary | ICD-10-CM

## 2025-07-18 PROCEDURE — 97802 MEDICAL NUTRITION INDIV IN: CPT | Performed by: DIETITIAN, REGISTERED

## 2025-07-18 NOTE — PROGRESS NOTES
" Nutrition Assessment Form    Patient Name: Sarahi Gutierrez    YOB: 1955    Sex: Female     Assessment Date: 7/18/2025  Start Time: 945 Stop Time: 1045 Total Minutes: 60     Data:  Present at session: self   Parent/Patient Concerns/reason for visit: \"I have diabetes and gastroparesis\"   Medical Dx/Reason for Referral: DM gastroparesis   Past Medical History[1]    Current Medications[2]     Additional Meds/Supplements: lutein   Special Learning Needs/barriers to learning/any new barriers N/a   Height: Ht Readings from Last 5 Encounters:   03/18/25 5' 6\" (1.676 m)   03/12/25 5' 6\" (1.676 m)   12/13/24 5' 6\" (1.676 m)   11/29/24 5' 6\" (1.676 m)   11/06/24 5' 6\" (1.676 m)      Weight: Wt Readings from Last 10 Encounters:   03/18/25 78.7 kg (173 lb 6.4 oz)   03/12/25 77.6 kg (171 lb)   12/13/24 81.6 kg (180 lb)   11/29/24 81.6 kg (180 lb)   11/06/24 81.8 kg (180 lb 6.4 oz)   10/02/24 79.6 kg (175 lb 6.4 oz)   09/09/24 80.3 kg (177 lb)   06/26/24 79.1 kg (174 lb 6.4 oz)   05/07/24 80.3 kg (177 lb)   03/12/24 78.5 kg (173 lb)     Estimated body mass index is 27.99 kg/m² as calculated from the following:    Height as of 3/18/25: 5' 6\" (1.676 m).    Weight as of 3/18/25: 78.7 kg (173 lb 6.4 oz).   Recent Weight Change: []Yes     [x]No  Amount:       Energy Needs:    Allergies[3] or intolerances NKFA   Social History     Substance and Sexual Activity   Alcohol Use Yes    Alcohol/week: 2.0 standard drinks of alcohol    Types: 1 Glasses of wine, 1 Cans of beer per week    Comment: Reduced consumption social    Ultra light beer - 16 oz- one a week maybe-    Tobacco Use History[4] N/a   Who shops? 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 []Yes     [x]No  When:      Why:         Diet Hx:  Rarely skips a meal-  never skips B or lunch-  drinking spring water some caffeine  Breakfast: Diet:  830-9  English muffin- eggs x 2 with cheese- and one slice of lunch meat ham or very little gresham- might have tomato   Or low fat greek yogurt with fresh fruit and some granola  Or low calorie bread x 1 slice with egg or avocado   Lunch: 130-  hungriest at this meal-   low calories bread- tomato or lettuce- thin slices lunch meat- 1 slice cheese- low fat mayonnaise- pickles   Dinner: 7 (later in summer earlier in winter)- hot dog with roll and or low calorie bread with pickle onions and mustard; with chips     Snacks: AM -   PM - 3-4ish if light lunch - might have yogurt-  low fat greek some fruit  HS -    Other Notes/ Initial Assessment:  Marija is here because she has gastroparesis.  She has used CGM previously but does not use it in the summer as it comes right off as she is outside quite a bit.  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   She works outside to get her BG down, she does not have low BG.  She is learning what are her trigger foods- creamed soups are one of them, carbonation with meals is an issue as well    Discussed the importance of a healthy lifestyle and it's impact on overall health, inlcuding adequate sleep, consistent activity, healthy stress management techniques,  importance of regular consistent food, portioning foods, front loading calories as able and adequate fluids throughout the day.  Practice mindful eating. Be sure to set aside time to eat, eat slowly (20 mins/meal), and savor your food.   Discussed basic guidelines of gastroparesis diet including small frequent meals, limiting caffeine, spicy foods, high fat/fried foods, etc.    Provided Gastroesophageal Reflux Disease (GERD) Nutrition Therapy and planning healthy meals, RD name and number for home use. FOllow up made.    Updated assessment (Follow up note only):         Nutrition Diagnosis:   Altered gastrointestinal function  related to Changes in the GI tract motility (i.e. gastroparesis) as evidenced by  Avoidance or limitation of estimated total intake or intake of specific foods/food groups due to GI symptoms       Any change or new dx since previous visit:   Altered nutrition related labs related to DM/endocrine disorder as evidenced by elevated BG and A1C.  Nutrition Diagnosis:   Overweight/obesity  related to Excess energy intake as evidenced by  BMI more than normative standard for age and sex (overweight 25-29.9)      Medical Nutrition Therapy Intervention:  [x]Individualized Meal Plan-Discussed the importance of eating 3 meals daily and not skipping, and how metabolism is affected.  Also discussed adding in small snacks or 5-6 small meals daily if desired vs 3 larger ones, and appropriate options and portions.  Appropriate timing of meals, including eating within 1 hr of waking and eating meals slowly 20mins/meals, minimizing mindless/boredom or habitual eating, etc was also mentioned.  Discussed the plate method of portioning foods, including half a plate fruits and vegetables or a half plate all vegetables, 1/4 of the plate a lean protein source or meat, and a 1/4 of the plate being a whole grain carb- usually 1/2-1c.  This should be followed for at least 2 meals of the day, but could also be followed  for all Fluid intake discussed and appropriate amounts and choices, at least 64oz of water daily. No sugar sweetened beverages. No juice (eat the fruit instead). S/S dehydration also covered. [x]Understanding Lab Values-labs from 3/8/25 reviewed HgBA1C 7.4 (7.1 11/4/24); ,    []Basic Pathophysiology of Disease []Food/Medication Interactions   []Food Diary [x]Exercise-Studies have shown that the ideal exercise goal is somewhere between 150 to 300 minutes of moderate intensity exercise a week.  Start with exercising 10 minutes every other day and gradually increase physical activity with a goal of at least 150 minutes of moderate intensity exercise a week, divided over at least 3 days a week.  An example of this would be exercising 30 minutes a day, 5 days a week.  Moderate intensity means you should be slightly out of breath, but still able to hold a conversation.  Resistance/ weight bearing training can increase muscle mass and increase our resting metabolic rate.   FULL BODY resistance training is recommended 2-3 times a week.  Do not do this on consecutive days to allow for muscle recovery.      Discussed importance of activity throughout the lifecycle and its impact on overall health/stress management, etc.     Aim for a bare minimum 5000 steps, even on days you do not exercise.   [x]Lifestyle/Behavior Modification Techniques-Discussed the importance of adequate sleep, and ideal sleeping conditions, including a cool temperature 64-68 degrees F, and a dark and quiet room. Also discussed the importance of a regular and consistent sleep routine, including minimizing blue light exposure an hour before sleeping.  Weekend habits should include staying fairly consistent with weekday sleep habits to minimize disruption during the week.  A connection was made between getting adequate and good quality sleep and the ability to handle stress the next day, make healthy food choices, and be active. []Medication,  "Mechanism of Action   []Label Reading: CHO/ Na/ Fat/ other_________ [x]Self Blood Glucose Monitoring-  she is finger sticking now but usually uses CGM- 185 after a meal   [x]Weight/BMI Goals: gain/lose/maintain-Short term goal of 2-4# x 1 month or next visit. Initial weight loss goal of 5-10% weight loss for improved health as studies have shown this is where we see the greatest impact on improving health and decreasing risk of obesity related conditions.  Weight loss can improve patient's co-morbid conditions and/or prevent weight-related complications. []Other -           Comprehension: []Excellent  [x]Very Good  []Good  []Fair   []Poor    Receptivity: []Excellent  [x]Very Good  []Good  []Fair   []Poor    Expected Compliance: []Excellent  [x]Very Good  []Good  []Fair   []Poor        Goals (initial)/ Progress made on previous goals/new goals:   3 meals daily no skipping by next follow up   2.  64-oz fluid daily by next follow up    3.  Minimize high fat/fried foods, watch raw vegetables and fruits- by next follow up       No follow-ups on file.  Labs:  CMP  Lab Results   Component Value Date    K 3.8 03/08/2025     03/08/2025    CO2 28 03/08/2025    BUN 16 03/08/2025    CREATININE 0.66 03/08/2025    GLUF 124 (H) 03/08/2025    CALCIUM 9.6 03/08/2025    AST 13 03/08/2025    ALT 16 03/08/2025    ALKPHOS 30 (L) 03/08/2025    EGFR 90 03/08/2025       BMP  Lab Results   Component Value Date    CALCIUM 9.6 03/08/2025    K 3.8 03/08/2025    CO2 28 03/08/2025     03/08/2025    BUN 16 03/08/2025    CREATININE 0.66 03/08/2025       Lipids  No results found for: \"CHOL\"  Lab Results   Component Value Date    HDL 67 11/08/2023    HDL 69 07/12/2022     Lab Results   Component Value Date    LDLCALC 64 11/08/2023    LDLCALC 58 07/12/2022     Lab Results   Component Value Date    TRIG 110 11/08/2023    TRIG 59 07/12/2022     No results found for: \"CHOLHDL\"    Hemoglobin A1C  Lab Results   Component Value Date    HGBA1C " "7.4 (H) 03/08/2025       Fasting Glucose  Lab Results   Component Value Date    GLUF 124 (H) 03/08/2025       Insulin     Thyroid  Lab Results   Component Value Date    TSH 3.18 07/11/2023       Hepatic Function Panel  Lab Results   Component Value Date    ALT 16 03/08/2025    AST 13 03/08/2025    ALKPHOS 30 (L) 03/08/2025       Celiac Disease Antibody Panel  No results found for: \"ENDOMYSIAL IGA\", \"GLIADIN IGA\", \"GLIADIN IGG\", \"IGA\", \"TISSUE TRANSGLUT AB\", \"TTG IGA\"   Iron  Lab Results   Component Value Date    IRON 96 03/01/2025    TIBC 448 03/01/2025    FERRITIN 176 03/01/2025            Virginia Fishman RD  [facility]  [address] [facility] [address] [id number]         [1]   Past Medical History:  Diagnosis Date    Castelan's esophagus 07/2023    Bicornate uterus     Diabetes mellitus (HCC) 2010/2011    Diagnosed by family provider    Gastroparesis     GERD (gastroesophageal reflux disease)     Hyperlipidemia     Hypertension     Macular degeneration of left eye     Sleep apnea     Sleep apnea, obstructive 9/2013    Probably had it earlier in my life   [2]   Current Outpatient Medications   Medication Sig Dispense Refill    atorvastatin (LIPITOR) 40 mg tablet Take 40 mg by mouth in the morning        Blood Glucose Monitoring Suppl (OneTouch Verio) w/Device KIT Use to test blood sugars once daily. 1 kit 0    doxycycline (VIBRAMYCIN) Take 2 immediately at discovery of attached tick Oral 2 at once-single dose for non pregnant, non lactating adults      fenofibrate (TRICOR) 54 MG tablet take 1 tablet by mouth once daily with meals      ferrous sulfate 325 (65 Fe) mg tablet every 24 hours      fluticasone (Flonase Allergy Relief) 50 mcg/act nasal spray 2 sprays every 24 hours      glucose blood (OneTouch Verio) test strip Use as instructed once daily 100 strip 3    Janumet -1000 MG TB24 TAKE 1 TABLET BY MOUTH EVERY DAY " IN THE MORNING 90 tablet 3    Magnesium Cl-Calcium Carbonate (SLOW-MAG PO) Take by mouth in the morning      metFORMIN (GLUCOPHAGE-XR) 500 mg 24 hr tablet Take 2 tablets (1,000 mg total) by mouth daily with dinner 180 tablet 3    MISC NATURAL PRODUCTS EX Apply topically Magnesium cream to legs at night for leg cramps      Multiple Vitamins-Minerals (MULTIVITAMIN ADULTS 50+ PO) Orally      Multiple Vitamins-Minerals (PRESERVISION AREDS PO) Take by mouth in the morning      nitrofurantoin (MACRODANTIN) 50 mg capsule prn      omeprazole (PriLOSEC) 20 mg delayed release capsule Take 40 mg by mouth daily      ondansetron (ZOFRAN) 4 mg tablet Take 1 tablet (4 mg total) by mouth every 8 (eight) hours as needed for nausea or vomiting 10 tablet 0    OneTouch Delica Lancets 30G MISC Use once daily 100 each 3    pioglitazone (ACTOS) 45 mg tablet Take 1 tablet (45 mg total) by mouth daily 90 tablet 0    valsartan-hydrochlorothiazide (DIOVAN-HCT) 320-25 MG per tablet Take 1 tablet by mouth daily       No current facility-administered medications for this visit.   [3]   Allergies  Allergen Reactions    Jardiance [Empagliflozin] Other (See Comments)     Frequent yeast infections    Iodinated Contrast Media Rash   [4]   Social History  Tobacco Use   Smoking Status Former    Current packs/day: 0.00    Average packs/day: 0.3 packs/day for 10.0 years (2.5 ttl pk-yrs)    Types: Cigarettes    Start date: 1975    Quit date: 1985    Years since quittin.5   Smokeless Tobacco Never   Tobacco Comments    Social habit, late teens through late 20s

## 2025-07-21 ENCOUNTER — APPOINTMENT (OUTPATIENT)
Dept: LAB | Facility: CLINIC | Age: 70
End: 2025-07-21
Payer: COMMERCIAL

## 2025-07-21 DIAGNOSIS — E11.65 TYPE 2 DIABETES MELLITUS WITH HYPERGLYCEMIA, WITHOUT LONG-TERM CURRENT USE OF INSULIN (HCC): ICD-10-CM

## 2025-07-21 DIAGNOSIS — E78.2 MIXED HYPERLIPIDEMIA: ICD-10-CM

## 2025-07-21 DIAGNOSIS — I10 PRIMARY HYPERTENSION: ICD-10-CM

## 2025-07-21 DIAGNOSIS — E11.42 DIABETIC POLYNEUROPATHY ASSOCIATED WITH TYPE 2 DIABETES MELLITUS (HCC): ICD-10-CM

## 2025-07-21 LAB
ALBUMIN SERPL BCG-MCNC: 4.3 G/DL (ref 3.5–5)
ALP SERPL-CCNC: 29 U/L (ref 34–104)
ALT SERPL W P-5'-P-CCNC: 17 U/L (ref 7–52)
ANION GAP SERPL CALCULATED.3IONS-SCNC: 11 MMOL/L (ref 4–13)
AST SERPL W P-5'-P-CCNC: 18 U/L (ref 13–39)
BILIRUB SERPL-MCNC: 0.51 MG/DL (ref 0.2–1)
BUN SERPL-MCNC: 23 MG/DL (ref 5–25)
CALCIUM SERPL-MCNC: 9.5 MG/DL (ref 8.4–10.2)
CHLORIDE SERPL-SCNC: 102 MMOL/L (ref 96–108)
CO2 SERPL-SCNC: 27 MMOL/L (ref 21–32)
CREAT SERPL-MCNC: 0.64 MG/DL (ref 0.6–1.3)
CREAT UR-MCNC: 32.1 MG/DL
EST. AVERAGE GLUCOSE BLD GHB EST-MCNC: 177 MG/DL
GFR SERPL CREATININE-BSD FRML MDRD: 91 ML/MIN/1.73SQ M
GLUCOSE P FAST SERPL-MCNC: 125 MG/DL (ref 65–99)
HBA1C MFR BLD: 7.8 %
MICROALBUMIN UR-MCNC: <7 MG/L
POTASSIUM SERPL-SCNC: 4.2 MMOL/L (ref 3.5–5.3)
PROT SERPL-MCNC: 6.8 G/DL (ref 6.4–8.4)
SODIUM SERPL-SCNC: 140 MMOL/L (ref 135–147)
TSH SERPL DL<=0.05 MIU/L-ACNC: 3.44 UIU/ML (ref 0.45–4.5)

## 2025-07-21 PROCEDURE — 82570 ASSAY OF URINE CREATININE: CPT

## 2025-07-21 PROCEDURE — 36415 COLL VENOUS BLD VENIPUNCTURE: CPT

## 2025-07-21 PROCEDURE — 83036 HEMOGLOBIN GLYCOSYLATED A1C: CPT

## 2025-07-21 PROCEDURE — 80053 COMPREHEN METABOLIC PANEL: CPT

## 2025-07-21 PROCEDURE — 84443 ASSAY THYROID STIM HORMONE: CPT

## 2025-07-21 PROCEDURE — 82043 UR ALBUMIN QUANTITATIVE: CPT

## 2025-07-22 ENCOUNTER — OFFICE VISIT (OUTPATIENT)
Dept: ENDOCRINOLOGY | Facility: HOSPITAL | Age: 70
End: 2025-07-22
Payer: COMMERCIAL

## 2025-07-22 VITALS
BODY MASS INDEX: 27.32 KG/M2 | OXYGEN SATURATION: 97 % | WEIGHT: 170 LBS | HEART RATE: 83 BPM | HEIGHT: 66 IN | SYSTOLIC BLOOD PRESSURE: 120 MMHG | DIASTOLIC BLOOD PRESSURE: 60 MMHG

## 2025-07-22 DIAGNOSIS — E11.42 DIABETIC POLYNEUROPATHY ASSOCIATED WITH TYPE 2 DIABETES MELLITUS (HCC): ICD-10-CM

## 2025-07-22 DIAGNOSIS — E78.2 MIXED HYPERLIPIDEMIA: ICD-10-CM

## 2025-07-22 DIAGNOSIS — I10 PRIMARY HYPERTENSION: ICD-10-CM

## 2025-07-22 DIAGNOSIS — E11.65 TYPE 2 DIABETES MELLITUS WITH HYPERGLYCEMIA, WITHOUT LONG-TERM CURRENT USE OF INSULIN (HCC): Primary | ICD-10-CM

## 2025-07-22 PROCEDURE — 99214 OFFICE O/P EST MOD 30 MIN: CPT | Performed by: INTERNAL MEDICINE

## 2025-07-22 PROCEDURE — G2211 COMPLEX E/M VISIT ADD ON: HCPCS | Performed by: INTERNAL MEDICINE

## 2025-07-22 NOTE — PROGRESS NOTES
Name: Sarahi Gutierrez      : 1955      MRN: [id number]  Encounter Provider: Elisa Madera MD  Encounter Date: 2025   Encounter department: [facility]    No chief complaint on file.  :  Assessment & Plan  Type 2 diabetes mellitus with hyperglycemia, without long-term current use of insulin (HCC)    Lab Results   Component Value Date    HGBA1C 7.8 (H) 2025       Orders:    Comprehensive metabolic panel; Future    Hemoglobin A1C; Future    Lipid Panel with Direct LDL reflex; Future    Diabetic polyneuropathy associated with type 2 diabetes mellitus (HCC)    Lab Results   Component Value Date    HGBA1C 7.8 (H) 2025       Orders:    Comprehensive metabolic panel; Future    Hemoglobin A1C; Future    Lipid Panel with Direct LDL reflex; Future    Primary hypertension    Orders:    Comprehensive metabolic panel; Future    Hemoglobin A1C; Future    Lipid Panel with Direct LDL reflex; Future    Mixed hyperlipidemia    Orders:    Comprehensive metabolic panel; Future    Hemoglobin A1C; Future    Lipid Panel with Direct LDL reflex; Future      Assessment & Plan  1. Type 2 diabetes mellitus: Not at treatment goal.  - Her A1c level has increased to 7.8, indicating suboptimal glycemic control.  - Reports increased hunger and occasional low blood glucose alerts from her FreeStyle Sweta 2+ device.  - Discussed dietary modifications, particularly reducing carbohydrate intake.  She will continue the current dose of pioglitazone, Janumet, and metformin for now.  - A prescription for FreeStyle Sweta 2+ will be provided through her healthcare provider. If her blood glucose levels remain elevated despite dietary changes, consideration will be given to adding a sulfonylurea such as glipizide or glimepiride.    2. Diabetic neuropathy.  - Reports occasional numbness and tingling in her feet, which is not severe.  - Uses magnesium cream for cramps, which has been  effective.    3. Hypertension.  She is normotensive in the office.  She will continue the same valsartan/HCTZ daily.    4. Hyperlipidemia.  She will continue the current dose of fenofibrate and atorvastatin.  Lipid profile will be performed with her next visit.    5. Gastroparesis.  - Consulted a dietitian for gastroparesis management, which is being covered under her diabetes care.    I have asked her to follow-up in 3 to 4 months with preceding hemoglobin A1c, CMP, and lipid panel.      Pertinent Medical History   Sarahi Gutierrez is a 69-year-old female with a history of type 2 diabetes with diabetic neuropathy, diagnosed 21 years ago, hypertension, and hyperlipidemia.    Diabetes complications include diabetic neuropathy. She reports no nephropathy, retinopathy, heart attack, stroke, or claudication.     She has tried Jardiance in the past but discontinued it due to recurrent UTIs. She also tried Trulicity but found it too expensive.       History of Present Illness   History of Present Illness  Sarahi Gutierrez is a 69-year-old female with a history of type 2 diabetes with diabetic neuropathy, diagnosed 21 years ago, hypertension, and hyperlipidemia, presenting for a follow-up visit.     Diabetes complications include diabetic neuropathy. She reports no nephropathy, retinopathy, heart attack, stroke, or claudication.    She experiences frequent urination at night, often waking up around 4:00 AM and 6:30 AM, which she attributes to her habit of drinking Sleepytime tea before bed. She expresses concern about potential bladder infections if she holds her urine, a problem she has encountered in the past. She has noticed an increase in her A1c levels and reports feeling hungrier than usual. She recently consulted a dietitian due to a diagnosis of gastroparesis, which she believes is linked to her diabetes. The dietitian provided valuable guidance on managing her condition. She acknowledges occasional dietary  indulgences but maintains an active lifestyle, particularly during this time of year when she engages in outdoor activities with her . She struggles with portion control, especially when it comes to bread and carbohydrates, but avoids cakes and other sweets.     She has been using the FreeStyle Sweta 2+ system to monitor her blood glucose levels but has not used it since 05/2025. She checks her blood sugar levels intermittently, typically when she feels concerned. Her fasting blood sugar level is usually around 125, rising to 185 after breakfast, and then dropping to around 100 between lunch and dinner. However, she notes that her blood sugar levels can spike significantly after dinner.     She is currently taking pioglitazone 45 mg daily, Janumet /2000 mg once daily in the morning, and metformin  mg twice at dinnertime.    She reports no numbness or tingling in her feet but does experience occasional cramps at night, which are relieved by magnesium cream.    She reports no blurry vision and is up-to-date with her eye doctor visits. She has macular degeneration and is advised to see her eye doctor every 3 months.    She reports no chest pain or shortness of breath.    She uses a CPAP machine for sleep apnea and typically gets about 6 hours of sleep per night.    Home blood glucometer readings:   She checks her blood sugar levels intermittently, typically when she feels concerned. Her fasting blood sugar level is usually around 125, rising to 185 after breakfast, and then dropping to around 100 between lunch and dinner. However, she notes that her blood sugar levels can spike significantly after dinner.    Current diabetic regimen:   She takes pioglitazone 45 mg daily, Janumet /1000 mg once daily and metformin  mg 2 at dinner.    She takes valsartan/HCTZ 320/25 mg daily for hypertension.    She takes atorvastatin 40 mg daily and fenofibrate 54 mg daily for hyperlipidemia.      Review of  "Systems as per HPI    Medical History Reviewed by provider this encounter:  Tobacco  Allergies  Meds  Problems  Med Hx  Surg Hx  Fam Hx     .  Medications Ordered Prior to Encounter[1]   Social History[2]     Medical History Reviewed by provider this encounter:  Tobacco  Allergies  Meds  Problems  Med Hx  Surg Hx  Fam Hx     .    Objective   /60   Pulse 83   Ht 5' 6\" (1.676 m)   Wt 77.1 kg (170 lb)   LMP  (LMP Unknown)   SpO2 97%   BMI 27.44 kg/m²      Body mass index is 27.44 kg/m².  Wt Readings from Last 3 Encounters:   07/22/25 77.1 kg (170 lb)   07/18/25 77.8 kg (171 lb 9.6 oz)   03/18/25 78.7 kg (173 lb 6.4 oz)     Physical Exam    Physical Exam  Vitals and nursing note reviewed.   Constitutional:       General: She is not in acute distress.     Appearance: Normal appearance. She is well-developed.   HENT:      Head: Normocephalic and atraumatic.     Eyes:      Conjunctiva/sclera: Conjunctivae normal.     Neck:      Vascular: No carotid bruit.      Comments: Healed anterior neck scar.  Thyroid normal in size.  No palpable nodules or masses of the neck.  Cardiovascular:      Rate and Rhythm: Normal rate and regular rhythm.      Heart sounds: Normal heart sounds. No murmur heard.  Pulmonary:      Effort: Pulmonary effort is normal. No respiratory distress.      Breath sounds: Normal breath sounds. No wheezing.   Abdominal:      Palpations: Abdomen is soft.     Musculoskeletal:         General: No swelling.      Cervical back: Neck supple.      Right lower leg: No edema.      Left lower leg: No edema.   Lymphadenopathy:      Cervical: No cervical adenopathy.     Skin:     General: Skin is warm and dry.     Neurological:      Mental Status: She is alert and oriented to person, place, and time.     Psychiatric:         Mood and Affect: Mood normal.       Last Eye Exam: 02/20/2025  Last Foot Exam: 03/18/2025  Health Maintenance   Topic Date Due    Diabetic Foot Exam  03/18/2026    Diabetic " Eye Exam  02/20/2027       Results    Labs: I have reviewed pertinent labs including:   Lab Results   Component Value Date    HGBA1C 7.8 (H) 07/21/2025    HGBA1C 7.4 (H) 03/08/2025    HGBA1C 7.1 (H) 11/04/2024     Blood work performed on 7/21/2025 showed a CMP with a glucose of 125 fasting but was otherwise normal.    TSH is 3.444.    Urine microalbumin to creatinine ratio is below the lower limit of detection.     Lab Results   Component Value Date    CREATININE 0.64 07/21/2025    CREATININE 0.66 03/08/2025    CREATININE 0.68 11/04/2024    BUN 23 07/21/2025    K 4.2 07/21/2025     07/21/2025    CO2 27 07/21/2025      eGFR   Date Value Ref Range Status   07/21/2025 91 ml/min/1.73sq m Final      HDL   Date Value Ref Range Status   11/08/2023 67 > OR = 50 mg/dL Final     Triglycerides   Date Value Ref Range Status   11/08/2023 110 <150 mg/dL Final      ALT   Date Value Ref Range Status   07/21/2025 17 7 - 52 U/L Final     Comment:     Specimen collection should occur prior to Sulfasalazine administration due to the potential for falsely depressed results.    11/04/2024 17 6 - 29 U/L Final     AST   Date Value Ref Range Status   07/21/2025 18 13 - 39 U/L Final   11/04/2024 14 10 - 35 U/L Final     Alkaline Phosphatase   Date Value Ref Range Status   07/21/2025 29 (L) 34 - 104 U/L Final   11/04/2024 31 (L) 37 - 153 U/L Final      Lab Results   Component Value Date    ONI1NMWYWWHY 3.444 07/21/2025           Patient Instructions   Hgba1c is 7.8%.  this is higher.     Continue the same pioglitazone and janumet and metformin.     We can add a medicine like glipizide or glimepiride next visit.     We can restart the freestyle osman 2 plus to tadoÂ°.    Work on the lower carb diet.     Follow up in 3-4 months with blood work.     Discussed with the patient and all questioned fully answered. She will call me if any problems arise.           [1]   Current Outpatient Medications on File Prior to Visit   Medication  Sig Dispense Refill    atorvastatin (LIPITOR) 40 mg tablet Take 40 mg by mouth in the morning      Blood Glucose Monitoring Suppl (OneTouch Verio) w/Device KIT Use to test blood sugars once daily. 1 kit 0    doxycycline (VIBRAMYCIN)       fenofibrate (TRICOR) 54 MG tablet       ferrous sulfate 325 (65 Fe) mg tablet every 24 hours      fluticasone (Flonase Allergy Relief) 50 mcg/act nasal spray 2 sprays every 24 hours      glucose blood (OneTouch Verio) test strip Use as instructed once daily 100 strip 3    Janumet -1000 MG TB24 TAKE 1 TABLET BY MOUTH EVERY DAY IN THE MORNING 90 tablet 3    metFORMIN (GLUCOPHAGE-XR) 500 mg 24 hr tablet Take 2 tablets (1,000 mg total) by mouth daily with dinner 180 tablet 3    MISC NATURAL PRODUCTS EX Apply topically Magnesium cream to legs at night for leg cramps      Multiple Vitamins-Minerals (MULTIVITAMIN ADULTS 50+ PO)       Multiple Vitamins-Minerals (PRESERVISION AREDS PO) Take by mouth in the morning      nitrofurantoin (MACRODANTIN) 50 mg capsule prn      omeprazole (PriLOSEC) 20 mg delayed release capsule Take 40 mg by mouth in the morning.      OneTouch Delica Lancets 30G MISC Use once daily 100 each 3    pioglitazone (ACTOS) 45 mg tablet Take 1 tablet (45 mg total) by mouth daily 90 tablet 0    valsartan-hydrochlorothiazide (DIOVAN-HCT) 320-25 MG per tablet Take 1 tablet by mouth in the morning.       No current facility-administered medications on file prior to visit.   [2]   Social History  Tobacco Use    Smoking status: Former     Current packs/day: 0.00     Average packs/day: 0.3 packs/day for 10.0 years (2.5 ttl pk-yrs)     Types: Cigarettes     Start date: 1975     Quit date: 1985     Years since quittin.5    Smokeless tobacco: Never    Tobacco comments:     Social habit, late teens through late 20s   Vaping Use    Vaping status: Never Used   Substance and Sexual Activity    Alcohol use: Yes     Alcohol/week: 2.0 standard drinks of alcohol      Types: 1 Glasses of wine, 1 Cans of beer per week     Comment: Reduced consumption    Drug use: Never    Sexual activity: Yes     Partners: Male     Birth control/protection: Post-menopausal

## 2025-07-22 NOTE — PATIENT INSTRUCTIONS
Hgba1c is 7.8%.  this is higher.     Continue the same pioglitazone and janumet and metformin.     We can add a medicine like glipizide or glimepiride next visit.     We can restart the freestyle osman 2 plus to [employer].    Work on the lower carb diet.     Follow up in 3-4 months with blood work.

## 2025-07-22 NOTE — ASSESSMENT & PLAN NOTE
Lab Results   Component Value Date    HGBA1C 7.8 (H) 07/21/2025       Orders:    Comprehensive metabolic panel; Future    Hemoglobin A1C; Future    Lipid Panel with Direct LDL reflex; Future

## 2025-08-01 ENCOUNTER — HOSPITAL ENCOUNTER (OUTPATIENT)
Dept: INFUSION CENTER | Facility: HOSPITAL | Age: 70
Discharge: HOME | End: 2025-08-01
Attending: INTERNAL MEDICINE
Payer: COMMERCIAL

## 2025-08-01 DIAGNOSIS — D64.9 NORMOCYTIC ANEMIA: Primary | ICD-10-CM

## 2025-08-01 DIAGNOSIS — E53.8 B12 DEFICIENCY: ICD-10-CM

## 2025-08-01 PROCEDURE — 96372 THER/PROPH/DIAG INJ SC/IM: CPT

## 2025-08-01 RX ORDER — CYANOCOBALAMIN 1000 UG/ML
1000 INJECTION, SOLUTION INTRAMUSCULAR; SUBCUTANEOUS ONCE
Status: COMPLETED | OUTPATIENT
Start: 2025-08-01 | End: 2025-08-01

## 2025-08-01 RX ORDER — CYANOCOBALAMIN 1000 UG/ML
1000 INJECTION, SOLUTION INTRAMUSCULAR; SUBCUTANEOUS ONCE
OUTPATIENT
Start: 2025-08-29

## 2025-08-01 RX ADMIN — CYANOCOBALAMIN 1000 MCG: 1000 INJECTION, SOLUTION INTRAMUSCULAR at 10:06
